# Patient Record
Sex: MALE | Race: WHITE | HISPANIC OR LATINO | Employment: FULL TIME
[De-identification: names, ages, dates, MRNs, and addresses within clinical notes are randomized per-mention and may not be internally consistent; named-entity substitution may affect disease eponyms.]

---
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
## 2020-07-22 ENCOUNTER — ANCILLARY PROCEDURE (OUTPATIENT)
Dept: GENERAL RADIOLOGY | Facility: CLINIC | Age: 20
End: 2020-07-22
Attending: FAMILY MEDICINE

## 2020-07-22 ENCOUNTER — OFFICE VISIT (OUTPATIENT)
Dept: URGENT CARE | Facility: URGENT CARE | Age: 20
End: 2020-07-22

## 2020-07-22 VITALS
TEMPERATURE: 97 F | OXYGEN SATURATION: 99 % | RESPIRATION RATE: 20 BRPM | SYSTOLIC BLOOD PRESSURE: 139 MMHG | HEART RATE: 83 BPM | WEIGHT: 277 LBS | DIASTOLIC BLOOD PRESSURE: 85 MMHG

## 2020-07-22 DIAGNOSIS — B07.8 COMMON WART: ICD-10-CM

## 2020-07-22 DIAGNOSIS — S69.91XA WRIST INJURY, RIGHT, INITIAL ENCOUNTER: Primary | ICD-10-CM

## 2020-07-22 DIAGNOSIS — S69.91XA WRIST INJURY, RIGHT, INITIAL ENCOUNTER: ICD-10-CM

## 2020-07-22 PROCEDURE — 99203 OFFICE O/P NEW LOW 30 MIN: CPT | Mod: 25 | Performed by: FAMILY MEDICINE

## 2020-07-22 PROCEDURE — 17110 DESTRUCTION B9 LES UP TO 14: CPT | Performed by: FAMILY MEDICINE

## 2020-07-22 PROCEDURE — 73110 X-RAY EXAM OF WRIST: CPT | Mod: RT

## 2020-07-23 NOTE — PATIENT INSTRUCTIONS
Make appointment to see primary care or dermatology to be seen in 3-4 weeks for this wart       --    WRIST PAIN  Ibuprofen (400-600mg)/tylenol (325-650mg) every 4-6 hours as needed    If the pain hasn't improved in 5-7 days return for re-evaluation

## 2020-07-23 NOTE — PROGRESS NOTES
"Subjective:   Alfonso Snowden is a 20 year old male who presents for   Chief Complaint   Patient presents with     Musculoskeletal Problem     was playing soccer last night and was goalie, \"jammed\" Right hand in ball. Having Right hand/wrist pain since then     Wart present for about a year on the back of right hand has tried OTC acid and freeze treatment but no effective results seen.     He is right hand dominant.   There are no active problems to display for this patient.    Current Outpatient Medications   Medication     order for DME     No current facility-administered medications for this visit.      ROS:  As above per HPI    Objective:   /85   Pulse 83   Temp 97  F (36.1  C)   Resp 20   Wt 125.6 kg (277 lb)   SpO2 99% , There is no height or weight on file to calculate BMI.  Gen:  NAD, well-nourished, sitting in chair comfortably  HEENT: EOMI, sclera anicteric, Head normocephalic, ; nares patent; moist mucous membranes  Neck: trachea midline, no thyromegaly  CV:  Hemodynamically stable  Pulm:  no increased work of breathing  Extrem: no cyanosis, edema or clubbing  Skin: no obvious rashes or abnormalities  R wrist: no swelling of the wrist, intact movement in flexion and extension although discomforting  R hand: able to move all fingers with intact sensation to touch, dorsal side of hand at the end of 2nd metacarpal is a large wart-like lesion about 1.5 cm in diameter with significant elevation > 2mm, no pain over the scaphoid        No results found for any visits on 07/22/20.  XR wrist r 3 views:  No fractures per my read  Assessment & Plan:   Alfonso Snowden, 20 year old male who presents with:    Common wart  Large solitary wart was treated today with cryotherapy applied along the circumference of the lesion along its border. 3 applications were done. Recommend f/u with PCP or derm in 3-4 weeks.  - DESTRUCT BENIGN LESION, UP TO 14    Wrist injury, right, initial " encounter  Negative for fracture  Wrist brace given for protection/comfort. F/u in  A week if pain has not improved. OTC analgesics PRN.   - XR Wrist Right G/E 3 Views  - order for DME  Dispense: 1 Device; Refill: 0      Chino Cano MD   [facility]    The use of Dragon/DroneCast dictation services may have been used to construct the content in this note; any grammatical or spelling errors are non-intentional. Please contact the author of this note directly if you are in need of any clarification.

## 2022-04-02 ENCOUNTER — APPOINTMENT (OUTPATIENT)
Dept: GENERAL RADIOLOGY | Facility: CLINIC | Age: 22
End: 2022-04-02
Attending: EMERGENCY MEDICINE
Payer: COMMERCIAL

## 2022-04-02 ENCOUNTER — HOSPITAL ENCOUNTER (EMERGENCY)
Facility: CLINIC | Age: 22
Discharge: HOME | End: 2022-04-02
Attending: EMERGENCY MEDICINE | Admitting: EMERGENCY MEDICINE
Payer: COMMERCIAL

## 2022-04-02 ENCOUNTER — APPOINTMENT (OUTPATIENT)
Dept: GENERAL RADIOLOGY | Facility: CLINIC | Age: 22
End: 2022-04-02
Attending: EMERGENCY MEDICINE

## 2022-04-02 VITALS
RESPIRATION RATE: 18 BRPM | TEMPERATURE: 98.1 F | HEART RATE: 105 BPM | DIASTOLIC BLOOD PRESSURE: 73 MMHG | WEIGHT: 277 LBS | OXYGEN SATURATION: 99 % | SYSTOLIC BLOOD PRESSURE: 111 MMHG

## 2022-04-02 DIAGNOSIS — S82.831A CLOSED FRACTURE OF DISTAL END OF RIGHT FIBULA, UNSPECIFIED FRACTURE MORPHOLOGY, INITIAL ENCOUNTER: ICD-10-CM

## 2022-04-02 DIAGNOSIS — S93.431A SYNDESMOTIC DISRUPTION OF RIGHT ANKLE, INITIAL ENCOUNTER: ICD-10-CM

## 2022-04-02 LAB
HOLD SPECIMEN: NORMAL

## 2022-04-02 PROCEDURE — 73610 X-RAY EXAM OF ANKLE: CPT | Mod: RT

## 2022-04-02 PROCEDURE — 99284 EMERGENCY DEPT VISIT MOD MDM: CPT | Mod: 25 | Performed by: EMERGENCY MEDICINE

## 2022-04-02 PROCEDURE — 999N000180 XR SURGERY CARM FLUORO LESS THAN 5 MIN: Mod: TC

## 2022-04-02 PROCEDURE — 96375 TX/PRO/DX INJ NEW DRUG ADDON: CPT | Performed by: EMERGENCY MEDICINE

## 2022-04-02 PROCEDURE — 73610 X-RAY EXAM OF ANKLE: CPT | Mod: 26 | Performed by: RADIOLOGY

## 2022-04-02 PROCEDURE — 250N000011 HC RX IP 250 OP 636: Performed by: EMERGENCY MEDICINE

## 2022-04-02 PROCEDURE — 250N000011 HC RX IP 250 OP 636

## 2022-04-02 PROCEDURE — 99285 EMERGENCY DEPT VISIT HI MDM: CPT | Mod: 25 | Performed by: EMERGENCY MEDICINE

## 2022-04-02 PROCEDURE — 96376 TX/PRO/DX INJ SAME DRUG ADON: CPT | Performed by: EMERGENCY MEDICINE

## 2022-04-02 PROCEDURE — 27786 TREATMENT OF ANKLE FRACTURE: CPT | Mod: 54 | Performed by: EMERGENCY MEDICINE

## 2022-04-02 PROCEDURE — 999N000065 XR ANKLE PORT RIGHT G/E 3 VIEWS

## 2022-04-02 PROCEDURE — 27786 TREATMENT OF ANKLE FRACTURE: CPT | Mod: RT | Performed by: EMERGENCY MEDICINE

## 2022-04-02 PROCEDURE — 96374 THER/PROPH/DIAG INJ IV PUSH: CPT | Performed by: EMERGENCY MEDICINE

## 2022-04-02 PROCEDURE — 999N000065 XR ANKLE RIGHT G/E 3 VIEWS: Mod: RT

## 2022-04-02 RX ORDER — NALOXONE HYDROCHLORIDE 0.4 MG/ML
0.2 INJECTION, SOLUTION INTRAMUSCULAR; INTRAVENOUS; SUBCUTANEOUS
Status: DISCONTINUED | OUTPATIENT
Start: 2022-04-02 | End: 2022-04-02 | Stop reason: HOSPADM

## 2022-04-02 RX ORDER — HYDROCODONE BITARTRATE AND ACETAMINOPHEN 5; 325 MG/1; MG/1
1 TABLET ORAL EVERY 6 HOURS PRN
Qty: 12 TABLET | Refills: 0 | Status: SHIPPED | OUTPATIENT
Start: 2022-04-02 | End: 2022-04-05

## 2022-04-02 RX ORDER — FENTANYL CITRATE 50 UG/ML
100 INJECTION, SOLUTION INTRAMUSCULAR; INTRAVENOUS ONCE
Status: COMPLETED | OUTPATIENT
Start: 2022-04-02 | End: 2022-04-02

## 2022-04-02 RX ORDER — NALOXONE HYDROCHLORIDE 0.4 MG/ML
0.4 INJECTION, SOLUTION INTRAMUSCULAR; INTRAVENOUS; SUBCUTANEOUS
Status: DISCONTINUED | OUTPATIENT
Start: 2022-04-02 | End: 2022-04-02 | Stop reason: HOSPADM

## 2022-04-02 RX ORDER — FENTANYL CITRATE 50 UG/ML
INJECTION, SOLUTION INTRAMUSCULAR; INTRAVENOUS
Status: COMPLETED
Start: 2022-04-02 | End: 2022-04-02

## 2022-04-02 RX ORDER — FENTANYL CITRATE 50 UG/ML
100 INJECTION, SOLUTION INTRAMUSCULAR; INTRAVENOUS
Status: COMPLETED | OUTPATIENT
Start: 2022-04-02 | End: 2022-04-02

## 2022-04-02 RX ADMIN — FENTANYL CITRATE 100 MCG: 50 INJECTION, SOLUTION INTRAMUSCULAR; INTRAVENOUS at 15:36

## 2022-04-02 RX ADMIN — HYDROMORPHONE HYDROCHLORIDE 1 MG: 1 INJECTION, SOLUTION INTRAMUSCULAR; INTRAVENOUS; SUBCUTANEOUS at 12:02

## 2022-04-02 RX ADMIN — FENTANYL CITRATE 100 MCG: 50 INJECTION, SOLUTION INTRAMUSCULAR; INTRAVENOUS at 13:51

## 2022-04-02 RX ADMIN — FENTANYL CITRATE 100 MCG: 50 INJECTION, SOLUTION INTRAMUSCULAR; INTRAVENOUS at 15:43

## 2022-04-02 RX ADMIN — FENTANYL CITRATE 100 MCG: 50 INJECTION, SOLUTION INTRAMUSCULAR; INTRAVENOUS at 14:00

## 2022-04-02 NOTE — ED PROVIDER NOTES
[facility] EMERGENCY DEPARTMENT ([facility])  4/02/22 VTB  History     Chief Complaint   Patient presents with     Ankle Pain     Right     The history is provided by the patient and medical records.     Alfonso Snowden is an otherwise healthy 21 year old male who presents to the emergency department for evaluation of right ankle pain. Patient reports last night he tripped and was trying to regain his balance when he inverted his ankle. He had pain initially after but he went to bed. Today he woke up with tenderness, swelling and pain to the right ankle. He is able to move and feel his toes on his right foot. He has not taken anything for his pain.     Past Medical History  Past Medical History:   Diagnosis Date     Acute bronchiolitis due to other infectious organisms     twice as an infant.     No past surgical history on file.  order for DME      Allergies   Allergen Reactions     No Known Drug Allergies      Family History  Family History   Problem Relation Age of Onset     Respiratory Father         asthma     Respiratory Paternal Grandfather         asthma     Social History   Social History     Tobacco Use     Smoking status: Never Smoker     Smokeless tobacco: Never Used   Substance Use Topics     Alcohol use: Not Currently     Drug use: Not Currently      Past medical history, past surgical history, medications, allergies, family history, and social history were reviewed with the patient. No additional pertinent items.       Review of Systems  A complete review of systems was performed with pertinent positives and negatives noted in the HPI, and all other systems negative.    Physical Exam   BP: (!) 161/65  Pulse: 117  Temp: 98.1  F (36.7  C)  Resp: 18  Weight: 125.6 kg (277 lb)  SpO2: 95 %  Physical Exam  Constitutional:       General: He is not in acute distress.     Appearance: He is not diaphoretic.   HENT:      Head: Atraumatic.   Eyes:      General: No scleral icterus.     Pupils:  Pupils are equal, round, and reactive to light.   Cardiovascular:      Heart sounds: Normal heart sounds.   Pulmonary:      Effort: No respiratory distress.      Breath sounds: Normal breath sounds.   Abdominal:      General: Bowel sounds are normal.      Palpations: Abdomen is soft.      Tenderness: There is no abdominal tenderness.   Musculoskeletal:         General: No tenderness.        Legs:    Skin:     General: Skin is warm.      Findings: No rash.                ED Course     10:39 AM  The patient was seen and examined by Jesús Rajput DO in [facility]    Splint Application    Date/Time: 4/2/2022 8:27 PM  Performed by: Jesús Rajput DO  Authorized by: Jesús Rajput DO     Risks, benefits and alternatives discussed.      PRE-PROCEDURE DETAILS     Sensation:  Normal    PROCEDURE DETAILS     Laterality:  Right    Location:  Ankle    Ankle:  R ankle    Strapping: no      Splint type:  Short leg and ankle stirrup    Supplies:  Ortho-Glass, cotton padding and elastic bandage    POST PROCEDURE DETAILS     Pain:  Unchanged    Sensation:  Normal      PROCEDURE    Patient Tolerance:  Patient tolerated the procedure well with no immediate complications                       No results found for any visits on 04/02/22.  Medications - No data to display     Assessments & Plan (with Medical Decision Making)   This is a 21-year-old male presents with right ankle pain.  This occurred after patient stumbled while walking.  He did not fully fall or have any other injuries.  On exam patient has considerable erythema and tenderness to the ankle.  There is tenderness over the lateral malleolus.  He is neurovascularly intact distally.  X-ray shows distal fibular fracture.  There is also widening that is consistent with syndesmotic injury.  I discussed the case with Orthopedic Surgery.  They note that this will need surgical repair as it is an unstable ankle  fracture.  Patient was given hydromorphone for pain.  Splint was placed using fentanyl for pain control.  On x-rays there was still demonstrated widening of the ankle mortise.  Patient was seen by Orthopedic Surgery who repeated reduction with better alignment.  They will set up an appointment for patient to be seen within the week with plan for surgery shortly after.  Patient will be nonweightbearing until that time.  We will discharge patient on a course of pain medication after discussion of risks regarding this medication. Will discharge home with return precautions. Discussed reasons to return to the emergency department.  Patient understands and agrees with this plan.    I have reviewed the nursing notes. I have reviewed the findings, diagnosis, plan and need for follow up with the patient.    New Prescriptions    No medications on file       Final diagnoses:   None     I, Saray Morin, am serving as a trained medical scribe to document services personally performed by Jesús Rajput DO based on the provider's statements to me on April 2, 2022.  This document has been checked and approved by the attending provider.    IJesús DO, was physically present and have reviewed and verified the accuracy of this note documented by Saray Morin medical scribe.      Jesús Rajput DO  --  Jesús Rajput DO  [facility] EMERGENCY DEPARTMENT  4/2/2022     Jesús Rajput DO  04/02/22 2030

## 2022-04-02 NOTE — ED TRIAGE NOTES
21 yr old male w/c to triage presenting with right ankle pain since last night after he tripped and twisted his right ankle when trying to regain balance. Inverted ankle during trip. Pain in ankle immediately afterwards. Not able to put any weight on right leg. Woke up with swelling and bruising right ankle this am. Has not taken anything for pain this am or last night. Obvious swelling and bruising to right ankle on exam. Point tenderness to medial malleolus.

## 2022-04-02 NOTE — DISCHARGE INSTRUCTIONS
Please make an appointment to follow up with Orthopedics Clinic (phone: [phone]) in 7 days if you do not hear from them.    Return to the emergency department if there are any new symptoms or any cause for concern.

## 2022-04-02 NOTE — CONSULTS
[facility]  ORTHOPAEDIC SURGERY CONSULT    DATE OF CONSULT: 4/2/2022 6:25 PM    REQUESTING PROVIDER Dr Rajput    CC: Right ankle fracture    DATE OF INJURY: 4/1/2022    HISTORY OF PRESENT ILLNESS:   Alfonso Snowden is a 21 year old, healthy, who slipped last night and twisted his right ankle.  He thought it was just a sprain but this morning had swelling and inability to bear weight.  He presented to the emergency department and x-rays revealed a displaced fibula fracture with associated medial clear space widening.  This is a closed injury.  No other injuries.  Pain currently mild.  The ER attempted reduction was unsuccessful therefore orthopedics was consulted.  Patient is otherwise been in his typical state health.    PAST MEDICAL HISTORY:   Past Medical History:   Diagnosis Date     Acute bronchiolitis due to other infectious organisms     twice as an infant.       PAST SURGICAL HISTORY:    No past surgical history on file.    MEDICATIONS:   Prior to Admission medications    Medication Sig Last Dose Taking? Auth Provider   HYDROcodone-acetaminophen (NORCO) 5-325 MG tablet Take 1 tablet by mouth every 6 hours as needed for pain  Yes Jesús Rajput DO   order for DME Equipment being ordered: wrist brace , right   Chino Cano MD       ALLERGIES:   No known drug allergies    SOCIAL HISTORY:   Lives alone  Does not smoke    FAMILY HISTORY:  Family History   Problem Relation Age of Onset     Respiratory Father         asthma     Respiratory Paternal Grandfather         asthma     REVIEW OF SYSTEMS:   A 10-point reviews of systems was negative except as noted above in the HPI.     PHYSICAL EXAM:   Vitals:    04/02/22 1018 04/02/22 1400 04/02/22 1500 04/02/22 1645   BP: (!) 161/65 115/81 113/69 111/73   Pulse: 117 114 105    Resp: 18      Temp: 98.1  F (36.7  C)      TempSrc: Oral      SpO2: 95% 97% 99% 99%   Weight: 125.6 kg (277 lb)        General: Awake, alert, appropriate,  following commands, NAD.  Neuro: CN II-XII grossly intact.   Skin: No rashes,  skin color normal.  HEENT: Atraumatic  Lungs: Breathing comfortably and nonlabored, no wheezes or stridor noted.  Heart/Cardiovascular: Regular pulse, no peripheral cyanosis.    Right Lower Extremity:   Obvious deformity of the ankle  No open wounds, small abrasion medial aspect  Tenderness to palpation at the fibula  No tenderness to palpation at the knee, thigh, hip  No pain with range of motion of the knee or hip.  Fires TA/GSC/EHL/FHL  SILT sp/dp/tibial/saph/sural nerves.   DP/PT pulses palpable, 2+, toes warm and well perfused.       LABS:  No results found for: HGB  No results found for: WBC  No results found for: PLT  No results found for: INR  No results found for: CR  No results found for: GLC    IMAGING:  X-rays of the right ankle: Displaced fibula fracture with medial clear space widening  X-rays right ankle post reduction: Persistent displaced fibula fracture with medial clear space widening  X-rays right ankle postreduction #2 Interval reduction to near anatomic position with no significant residual medial clear space widening    IMPRESSION:   Alfonso Snowden is a 21 year old who slipped and fell on 4/1/2022 and sustained a closed bimalleolar equivalent right ankle fracture.  This was closed reduced and splinted in the emergency department    RECOMMENDATIONS:   Okay to discharge from the emergency department  Discussed with the patient nonweightbearing, crutches, keeping the leg elevated  Discussed with him that this would be ultimately something that would benefit from surgical repair    We will have schedulers contact him this week for follow-up    Assessment and Plan discussed with Dr. Laura, Orthopaedic Surgery Staff.     Mickey Beltran MD 4/2/2022 6:25 PM  Orthopaedic Surgery Resident, PGY-4

## 2022-04-03 ENCOUNTER — HOSPITAL ENCOUNTER (EMERGENCY)
Facility: CLINIC | Age: 22
Discharge: HOME | End: 2022-04-03
Attending: EMERGENCY MEDICINE | Admitting: EMERGENCY MEDICINE
Payer: COMMERCIAL

## 2022-04-03 VITALS
RESPIRATION RATE: 18 BRPM | SYSTOLIC BLOOD PRESSURE: 152 MMHG | OXYGEN SATURATION: 97 % | BODY MASS INDEX: 34.82 KG/M2 | HEIGHT: 75 IN | DIASTOLIC BLOOD PRESSURE: 99 MMHG | TEMPERATURE: 98.1 F | HEART RATE: 108 BPM | WEIGHT: 280 LBS

## 2022-04-03 DIAGNOSIS — Z47.89 CAST DISCOMFORT: ICD-10-CM

## 2022-04-03 DIAGNOSIS — M79.674 PAIN IN TOE OF RIGHT FOOT: ICD-10-CM

## 2022-04-03 PROCEDURE — 99282 EMERGENCY DEPT VISIT SF MDM: CPT

## 2022-04-03 PROCEDURE — 99282 EMERGENCY DEPT VISIT SF MDM: CPT | Performed by: EMERGENCY MEDICINE

## 2022-04-03 ASSESSMENT — ENCOUNTER SYMPTOMS
SHORTNESS OF BREATH: 0
COLOR CHANGE: 0
BRUISES/BLEEDS EASILY: 0
FEVER: 0
NUMBNESS: 0
ADENOPATHY: 0
JOINT SWELLING: 0
WEAKNESS: 0
CHILLS: 0

## 2022-04-04 ENCOUNTER — TELEPHONE (OUTPATIENT)
Dept: ORTHOPEDICS | Facility: CLINIC | Age: 22
End: 2022-04-04

## 2022-04-04 NOTE — ED PROVIDER NOTES
ED Provider Note  [facility]      History     Chief Complaint   Patient presents with     Cast Problem     HPI  Alfonso Snowden is a 21 year old otherwise healthy male who presents to the ED for evaluation of his splint that was placed yesterday here in the ED. Patient presented yesterday to the ED after he stumbled when walking and x-ray revealed a right distal fibular fracture. Orthopedic surgery noted  That he will need surgical intervention and splint was placed in the ED until his follow up with orthopedic surgery.     Patient presents to the emergency department complaining of pain in his right, fifth toe.  He states that the cast is pushing and cutting into his right fifth toe.  He denies any discoloration of the toes in his right foot.  Denies any worsening pain in his right ankle.  No fevers.  No other concerns or complaints.      Past Medical History  Past Medical History:   Diagnosis Date     Acute bronchiolitis due to other infectious organisms     twice as an infant.     History reviewed. No pertinent surgical history.  HYDROcodone-acetaminophen (NORCO) 5-325 MG tablet  order for DME      Allergies   Allergen Reactions     No Known Drug Allergies      Family History  Family History   Problem Relation Age of Onset     Respiratory Father         asthma     Respiratory Paternal Grandfather         asthma     Social History   Social History     Tobacco Use     Smoking status: Never Smoker     Smokeless tobacco: Never Used   Substance Use Topics     Alcohol use: Not Currently     Drug use: Not Currently      Past medical history, past surgical history, medications, allergies, family history, and social history were reviewed with the patient. No additional pertinent items.       Review of Systems   Constitutional: Negative for chills and fever.   Respiratory: Negative for shortness of breath.    Musculoskeletal: Negative for gait problem and joint swelling.        Pain in  "right 5th toe.   Skin: Negative for color change and pallor.   Neurological: Negative for weakness and numbness.   Hematological: Negative for adenopathy. Does not bruise/bleed easily.     A complete review of systems was performed with pertinent positives and negatives noted in the HPI, and all other systems negative.    Physical Exam   BP: (!) 152/99  Pulse: 108  Temp: 98.1  F (36.7  C)  Resp: 18  Height: 190.5 cm (6' 3\")  Weight: 127 kg (280 lb)  SpO2: 97 %  Physical Exam  Constitutional:       General: He is not in acute distress.     Appearance: Normal appearance. He is not ill-appearing, toxic-appearing or diaphoretic.   HENT:      Head: Normocephalic and atraumatic.   Eyes:      Conjunctiva/sclera: Conjunctivae normal.   Cardiovascular:      Rate and Rhythm: Tachycardia present.      Pulses: Normal pulses.           Dorsalis pedis pulses are 2+ on the right side.   Pulmonary:      Effort: Pulmonary effort is normal. No respiratory distress.   Musculoskeletal:         General: No tenderness.      Comments: RLE splinted with plaster.  Edge of the splint is pushing into the right, 5th toe.  No tenderness, edema, erythema, pallor, or discoloration of right foot.   Skin:     General: Skin is warm.      Capillary Refill: Capillary refill takes less than 2 seconds.      Coloration: Skin is not pale.      Findings: No bruising or erythema.      Comments: RLE splinted with plaster.  Edge of the splint is pushing into the right, 5th toe.  No tenderness, edema, erythema, pallor, or discoloration of right foot.   Neurological:      General: No focal deficit present.      Mental Status: He is alert and oriented to person, place, and time.      Coordination: Coordination normal.   Psychiatric:         Mood and Affect: Mood normal.         Behavior: Behavior normal.         Thought Content: Thought content normal.         Judgment: Judgment normal.         ED Course      Procedures     SPLINT READJUSTMENT: Edge of splint " was pushing into right, 5th toe.  Edge of the splint was trimmed with mercy and slightly loosened to the point where patient was able to move right, 5th toe without difficulty.                     No results found for any visits on 04/03/22.  Medications - No data to display     Assessments & Plan (with Medical Decision Making)   21-year-old man presenting with cast/splint discomfort.  He has good perfusion of his distal right lower extremity.  It appears that the edge of the splint is pushing into his right fifth toe and this was loosened in the emergency department to the point where range of motion of right fifth toe was not affected.  His pain has resolved after this.  Again, his right lower extremity/foot is well-perfused and I do not suspect any vascular compromise of the right lower extremity at this time.  Remainder of his splint was left in place and he was advised to follow-up with his at his already scheduled orthopedic appointment.  He agrees with the plan.  He is stable for discharge.  He agrees to return if his symptoms recur.      This part of the medical record was transcribed by Josefina Mauricio, Medical Scribe, from a dictation done by Kailee Macias MD.     I have reviewed the nursing notes. I have reviewed the findings, diagnosis, plan and need for follow up with the patient.    Discharge Medication List as of 4/3/2022  9:34 PM          Final diagnoses:   Cast discomfort   I was physically present and have reviewed and verified the accuracy of this note documented by my scribe.    Kailee Macias MD        --  Kailee Macias MD  [facility] EMERGENCY DEPARTMENT  4/3/2022     Kailee Macias MD  04/03/22 3311

## 2022-04-04 NOTE — TELEPHONE ENCOUNTER
Called patient and spoke with him about scheduling an appointment.  I offered the appointment time as stated in the below message.  Patient accepted the time and asked that I send him an appointment reminder for the address and clinic information.  This was sent to his email [email or web address].  Appointment is set for Friday, April 8 at 1 PM

## 2022-04-04 NOTE — DISCHARGE INSTRUCTIONS
Please follow up with Orthopedics Clinic (phone: [phone]) at your already scheduled appointment.  If you experience any other issued with your splint prior to your follow-up appointment please come back to the Emergency Department.

## 2022-04-04 NOTE — TELEPHONE ENCOUNTER
Called patient twice and left two voicemail's asking to patient to call back to get scheduled for a follow up visit with one of our orthopedic providers. I provided him with the clinic call back number. If he calls back please offer him an appointment with Dr. Houser this Friday 4/8/22 at 1:00 pm for a 40 minute appointment. This is not a templated time slot so the appointment will need to be manually entered.

## 2022-04-04 NOTE — ED TRIAGE NOTES
Patient arrives to triage in a wheelchair with his uncle from home.  Patient was here two days ago, the cast is digging into his pinky toe. He is wondering if they can rewrap it so it is not hurting toe, the pain is pretty bad. He has broke his ankle and tore a ligament which a cast was applied here.  VSS  Patient states he is not losing any feeling in toe it just hurts.

## 2022-04-06 NOTE — TELEPHONE ENCOUNTER
FUTURE VISIT INFORMATION      FUTURE VISIT INFORMATION:    Date: 4/8/2022    Time: 1pm    Location: [facility] [place]  REFERRAL INFORMATION:    Referring provider:  Dr. Rajput     Referring providers clinic:  [place] [facility]     Reason for visit/diagnosis  Closed Fracture distal end of right fibula    RECORDS REQUESTED FROM:       Clinic name Comments Records Status Imaging Status   INternal ED Visit-4/2/2022    XR Ankle-4/2/2022    XR Ankle Port-4/2/2022 Epic PACS

## 2022-04-08 ENCOUNTER — OFFICE VISIT (OUTPATIENT)
Dept: ORTHOPEDICS | Facility: CLINIC | Age: 22
End: 2022-04-08
Payer: COMMERCIAL

## 2022-04-08 ENCOUNTER — PRE VISIT (OUTPATIENT)
Dept: ORTHOPEDICS | Facility: CLINIC | Age: 22
End: 2022-04-08

## 2022-04-08 VITALS — HEIGHT: 72 IN | WEIGHT: 280 LBS | BODY MASS INDEX: 37.93 KG/M2

## 2022-04-08 DIAGNOSIS — S82.891A CLOSED FRACTURE OF RIGHT ANKLE, INITIAL ENCOUNTER: Primary | ICD-10-CM

## 2022-04-08 DIAGNOSIS — Z11.59 ENCOUNTER FOR SCREENING FOR OTHER VIRAL DISEASES: Primary | ICD-10-CM

## 2022-04-08 PROCEDURE — 99204 OFFICE O/P NEW MOD 45 MIN: CPT | Mod: GC | Performed by: ORTHOPAEDIC SURGERY

## 2022-04-08 RX ORDER — HYDROCODONE BITARTRATE AND ACETAMINOPHEN 5; 325 MG/1; MG/1
1 TABLET ORAL EVERY 6 HOURS PRN
COMMUNITY

## 2022-04-08 RX ORDER — CEFAZOLIN SODIUM 1 G/3ML
1 INJECTION, POWDER, FOR SOLUTION INTRAMUSCULAR; INTRAVENOUS SEE ADMIN INSTRUCTIONS
Status: CANCELLED | OUTPATIENT
Start: 2022-04-08

## 2022-04-08 RX ORDER — CEFAZOLIN SODIUM 2 G/50ML
2 SOLUTION INTRAVENOUS
Status: CANCELLED | OUTPATIENT
Start: 2022-04-08

## 2022-04-08 NOTE — H&P (VIEW-ONLY)
Orthopaedic Surgery    S: Alfonso is a very pleasant 21-year-old gentleman who presents to clinic for evaluation and treatment of his right ankle.  He was at home when he tripped and twisted the ankle.  He thought he sprained it and went to bed but woke up the next morning with a lot of swelling and pain so he went to the emergency room.  There, x-rays were obtained and revealed a displaced distal fibula fracture with disrupted ankle mortise.  The ankle was reduced and placed in a splint.  He has been nonweightbearing since that time.  He states the splint is relatively comfortable without any specific areas that are irritating or causing pain.  His pain has been controlled with Norco which he got from the emergency room.  He is on no other medications at baseline.  He has no previous history of foot or ankle pain or problems other than possibly a remote ankle sprain once but he does not recall which side.  No numbness or tingling.  No pain in the calf.  He did obtain a knee scooter yesterday.  He lives on a third floor apartment with his uncle and has good family support however, the building does not have an elevator.  He reports he has been up tach at [employer].       There is no problem list on file for this patient.           Past Medical History:   Diagnosis Date     Acute bronchiolitis due to other infectious organisms     twice as an infant.          No past surgical history on file.   No previous surgeries. No family history of anaesthesia.   No history of bleeding or clotting disorders.          Social History     Tobacco Use     Smoking status: Never Smoker     Smokeless tobacco: Never Used   Substance Use Topics     Alcohol use: Not Currently            Family History   Problem Relation Age of Onset     Respiratory Father         asthma     Respiratory Paternal Grandfather         asthma               Allergies   Allergen Reactions     No Known Drug Allergies             Current  Outpatient Medications   Medication Sig Dispense Refill     HYDROcodone-acetaminophen (NORCO) 5-325 MG tablet Take 1 tablet by mouth every 6 hours as needed for severe pain       order for DME Equipment being ordered: wrist brace , right 1 Device 0         O:   On exam, he is alert and oriented x3, no acute distress.  He is ambulating with a wheelchair.  Exam of the right lower extremity demonstrates proximal calf is soft and nontender.  Nontender at the proximal fibula.  Toes are warm and well-perfused with brisk capillary refill.  Sensation is grossly intact to light touch throughout the forefoot.  He is able to flex and extend all toes with 5 out of 5 strength.    Exam of the left lower extremity demonstrates the calf is soft and nontender.  No swelling or lower extremity edema.  Skin intact.  2+ pulses over the dorsalis pedis and posterior tibialis.  Neurovascularly intact.  Ankle range of motion is supple with 5 out of 5 strength for anterior tibialis, posterior tibialis tendon, gastrocsoleus complex, and peroneals.  8 out of 5 strength for EHL and FHL.  Diffusely nontender throughout the extremity.      Imaging:  Radiographs dated 4/2/2022 reveal a displaced Smith C distal fibular fracture with widening of the syndesmosis and the medial clear space.  Postreduction x-rays demonstrate well splinted ankle with significantly improved ankle mortise.  No fracture of the medial malleolus or posterior malleolus appreciated.      A: Smith C distal fibular fracture with likely syndesmotic disruption, date of injury 4/1/2022, closed reduced in the emergency room on 4/2/2022.    P: X-rays and exam findings reviewed with the patient.  We discussed the nature of his fracture and increased risk of posttraumatic arthrosis if left treated without surgery.  Reviewed the risks and benefits of surgery and the recovery course including nonweightbearing for 8 weeks followed by progressive weightbearing in the cam boot.  We  discussed he would be casted for the first 2 weeks following surgery and a plaster cast and would transition to a CAM boot but remain nonweightbearing for an additional 6 weeks. He wished to proceed with surgery and we will make arrangements for this for next week.            In addition to the above assessment and plan each active problem on Alfonso's problem list was evaluated today. This included the questioning of Alfonso for any medication problems. We will continue the current treatment plan for these active problems except as noted.    The patient was seen and evaluated with Dr. Houser today. Documentation was completed by Earlene Yepez PA-C.

## 2022-04-08 NOTE — PROGRESS NOTES
Orthopaedic Surgery    S: Alfonso is a very pleasant 21-year-old gentleman who presents to clinic for evaluation and treatment of his right ankle.  He was at home when he tripped and twisted the ankle.  He thought he sprained it and went to bed but woke up the next morning with a lot of swelling and pain so he went to the emergency room.  There, x-rays were obtained and revealed a displaced distal fibula fracture with disrupted ankle mortise.  The ankle was reduced and placed in a splint.  He has been nonweightbearing since that time.  He states the splint is relatively comfortable without any specific areas that are irritating or causing pain.  His pain has been controlled with Norco which he got from the emergency room.  He is on no other medications at baseline.  He has no previous history of foot or ankle pain or problems other than possibly a remote ankle sprain once but he does not recall which side.  No numbness or tingling.  No pain in the calf.  He did obtain a knee scooter yesterday.  He lives on a third floor apartment with his uncle and has good family support however, the building does not have an elevator.  He reports he has been up tach at [employer].       There is no problem list on file for this patient.           Past Medical History:   Diagnosis Date     Acute bronchiolitis due to other infectious organisms     twice as an infant.          No past surgical history on file.   No previous surgeries. No family history of anaesthesia.   No history of bleeding or clotting disorders.          Social History     Tobacco Use     Smoking status: Never Smoker     Smokeless tobacco: Never Used   Substance Use Topics     Alcohol use: Not Currently            Family History   Problem Relation Age of Onset     Respiratory Father         asthma     Respiratory Paternal Grandfather         asthma               Allergies   Allergen Reactions     No Known Drug Allergies             Current  Outpatient Medications   Medication Sig Dispense Refill     HYDROcodone-acetaminophen (NORCO) 5-325 MG tablet Take 1 tablet by mouth every 6 hours as needed for severe pain       order for DME Equipment being ordered: wrist brace , right 1 Device 0         O:   On exam, he is alert and oriented x3, no acute distress.  He is ambulating with a wheelchair.  Exam of the right lower extremity demonstrates proximal calf is soft and nontender.  Nontender at the proximal fibula.  Toes are warm and well-perfused with brisk capillary refill.  Sensation is grossly intact to light touch throughout the forefoot.  He is able to flex and extend all toes with 5 out of 5 strength.    Exam of the left lower extremity demonstrates the calf is soft and nontender.  No swelling or lower extremity edema.  Skin intact.  2+ pulses over the dorsalis pedis and posterior tibialis.  Neurovascularly intact.  Ankle range of motion is supple with 5 out of 5 strength for anterior tibialis, posterior tibialis tendon, gastrocsoleus complex, and peroneals.  8 out of 5 strength for EHL and FHL.  Diffusely nontender throughout the extremity.      Imaging:  Radiographs dated 4/2/2022 reveal a displaced Smith C distal fibular fracture with widening of the syndesmosis and the medial clear space.  Postreduction x-rays demonstrate well splinted ankle with significantly improved ankle mortise.  No fracture of the medial malleolus or posterior malleolus appreciated.      A: Smith C distal fibular fracture with likely syndesmotic disruption, date of injury 4/1/2022, closed reduced in the emergency room on 4/2/2022.    P: X-rays and exam findings reviewed with the patient.  We discussed the nature of his fracture and increased risk of posttraumatic arthrosis if left treated without surgery.  Reviewed the risks and benefits of surgery and the recovery course including nonweightbearing for 8 weeks followed by progressive weightbearing in the cam boot.  We  discussed he would be casted for the first 2 weeks following surgery and a plaster cast and would transition to a CAM boot but remain nonweightbearing for an additional 6 weeks. He wished to proceed with surgery and we will make arrangements for this for next week.            In addition to the above assessment and plan each active problem on Alfonso's problem list was evaluated today. This included the questioning of Alfonso for any medication problems. We will continue the current treatment plan for these active problems except as noted.    The patient was seen and evaluated with Dr. Houser today. Documentation was completed by Earlene Yepez PA-C.

## 2022-04-08 NOTE — NURSING NOTE
Reason For Visit:   Chief Complaint   Patient presents with     Consult     Right tibia fracture. Tripped and twisted ankle. Very swollen next day, went to ED. No prior injury.       Ht 1.829 m (6')   Wt 127 kg (280 lb)   BMI 37.97 kg/m           Heide Martinez, ATC

## 2022-04-08 NOTE — LETTER
Date:April 11, 2022      Provider requested that no letter be sent. Do not send.       [facility]

## 2022-04-08 NOTE — NURSING NOTE
Teaching Flowsheet   Relevant Diagnosis: R Ankle ORIF  Teaching Topic: R Ankle ORIF     RN Note: Pt is calm and cooperative, asking questions appropriately. Pt was instructed on pre-surgical packet requirements including H&P, COVID-19 test, NPO, and pre-surgical scrub. Pt verbalized understanding. Pt went to ED on 4/4 will use as H&P, COVID test 3-4 days before surgery, scrub and packet given to pt. Pt will have surgery at [facility] on 4/13.     Person(s) involved in teaching:   Patient      Motivation Level:  Asks Questions: Yes  Eager to Learn: Yes  Cooperative: Yes  Receptive (willing/able to accept information): Yes  Any cultural factors/Shinto beliefs that may influence understanding or compliance? No     Patient demonstrates understanding of the following:  Reason for the appointment, diagnosis and treatment plan: Yes  Knowledge of proper use of medications and conditions for which they are ordered (with special attention to potential side effects or drug interactions): Yes  Which situations necessitate calling provider and whom to contact: Yes    Proper use and care of (medical equip, care aids, etc.): Yes  Nutritional needs and diet plan: Yes  Pain management techniques: Yes  Wound Care: Yes  How and/when to access community resources: Yes    Marcelo Medellin, RNCC

## 2022-04-08 NOTE — LETTER
4/8/2022         RE: Alfonso Snowden  [address]  [place] [place] [address]        Dear Colleague,    Thank you for referring your patient, Alfonso Snowden, to the [facility] [place]. Please see a copy of my visit note below.      Orthopaedic Surgery    S: Alfonso is a very pleasant 21-year-old gentleman who presents to clinic for evaluation and treatment of his right ankle.  He was at home when he tripped and twisted the ankle.  He thought he sprained it and went to bed but woke up the next morning with a lot of swelling and pain so he went to the emergency room.  There, x-rays were obtained and revealed a displaced distal fibula fracture with disrupted ankle mortise.  The ankle was reduced and placed in a splint.  He has been nonweightbearing since that time.  He states the splint is relatively comfortable without any specific areas that are irritating or causing pain.  His pain has been controlled with Norco which he got from the emergency room.  He is on no other medications at baseline.  He has no previous history of foot or ankle pain or problems other than possibly a remote ankle sprain once but he does not recall which side.  No numbness or tingling.  No pain in the calf.  He did obtain a knee scooter yesterday.  He lives on a third floor apartment with his uncle and has good family support however, the building does not have an elevator.  He reports he has been up tach at [employer].       There is no problem list on file for this patient.           Past Medical History:   Diagnosis Date     Acute bronchiolitis due to other infectious organisms     twice as an infant.          No past surgical history on file.   No previous surgeries. No family history of anaesthesia.   No history of bleeding or clotting disorders.          Social History     Tobacco Use     Smoking status: Never Smoker     Smokeless tobacco: Never Used   Substance Use  Topics     Alcohol use: Not Currently            Family History   Problem Relation Age of Onset     Respiratory Father         asthma     Respiratory Paternal Grandfather         asthma               Allergies   Allergen Reactions     No Known Drug Allergies             Current Outpatient Medications   Medication Sig Dispense Refill     HYDROcodone-acetaminophen (NORCO) 5-325 MG tablet Take 1 tablet by mouth every 6 hours as needed for severe pain       order for DME Equipment being ordered: wrist brace , right 1 Device 0         O:   On exam, he is alert and oriented x3, no acute distress.  He is ambulating with a wheelchair.  Exam of the right lower extremity demonstrates proximal calf is soft and nontender.  Nontender at the proximal fibula.  Toes are warm and well-perfused with brisk capillary refill.  Sensation is grossly intact to light touch throughout the forefoot.  He is able to flex and extend all toes with 5 out of 5 strength.    Exam of the left lower extremity demonstrates the calf is soft and nontender.  No swelling or lower extremity edema.  Skin intact.  2+ pulses over the dorsalis pedis and posterior tibialis.  Neurovascularly intact.  Ankle range of motion is supple with 5 out of 5 strength for anterior tibialis, posterior tibialis tendon, gastrocsoleus complex, and peroneals.  8 out of 5 strength for EHL and FHL.  Diffusely nontender throughout the extremity.      Imaging:  Radiographs dated 4/2/2022 reveal a displaced Smith C distal fibular fracture with widening of the syndesmosis and the medial clear space.  Postreduction x-rays demonstrate well splinted ankle with significantly improved ankle mortise.  No fracture of the medial malleolus or posterior malleolus appreciated.      A: Smith C distal fibular fracture with likely syndesmotic disruption, date of injury 4/1/2022, closed reduced in the emergency room on 4/2/2022.    P: X-rays and exam findings reviewed with the patient.  We discussed  the nature of his fracture and increased risk of posttraumatic arthrosis if left treated without surgery.  Reviewed the risks and benefits of surgery and the recovery course including nonweightbearing for 8 weeks followed by progressive weightbearing in the cam boot.  We discussed he would be casted for the first 2 weeks following surgery and a plaster cast and would transition to a CAM boot but remain nonweightbearing for an additional 6 weeks. He wished to proceed with surgery and we will make arrangements for this for next week.            In addition to the above assessment and plan each active problem on Alfonso's problem list was evaluated today. This included the questioning of Alfonso for any medication problems. We will continue the current treatment plan for these active problems except as noted.    The patient was seen and evaluated with Dr. Carrillo today. Documentation was completed by Earlene Yepez PA-C.      Attestation signed by Andrea Carrillo MD at 4/9/2022  9:37 AM:  I was present the entire visit and have communicated with the Patient and the PA.  I concur with evaluation and treatment plan.    Andrea Carrillo MD      Again, thank you for allowing me to participate in the care of your patient.        Sincerely,        ANDREA CARRILLO MD

## 2022-04-09 ENCOUNTER — LAB (OUTPATIENT)
Dept: LAB | Facility: CLINIC | Age: 22
End: 2022-04-09
Attending: ORTHOPAEDIC SURGERY

## 2022-04-09 DIAGNOSIS — Z11.59 ENCOUNTER FOR SCREENING FOR OTHER VIRAL DISEASES: ICD-10-CM

## 2022-04-09 LAB — SARS-COV-2 RNA RESP QL NAA+PROBE: NEGATIVE

## 2022-04-09 PROCEDURE — U0003 INFECTIOUS AGENT DETECTION BY NUCLEIC ACID (DNA OR RNA); SEVERE ACUTE RESPIRATORY SYNDROME CORONAVIRUS 2 (SARS-COV-2) (CORONAVIRUS DISEASE [COVID-19]), AMPLIFIED PROBE TECHNIQUE, MAKING USE OF HIGH THROUGHPUT TECHNOLOGIES AS DESCRIBED BY CMS-2020-01-R: HCPCS

## 2022-04-12 ENCOUNTER — ANESTHESIA EVENT (OUTPATIENT)
Dept: SURGERY | Facility: AMBULATORY SURGERY CENTER | Age: 22
End: 2022-04-12

## 2022-04-12 RX ORDER — NALOXONE HYDROCHLORIDE 0.4 MG/ML
0.4 INJECTION, SOLUTION INTRAMUSCULAR; INTRAVENOUS; SUBCUTANEOUS
Status: DISCONTINUED | OUTPATIENT
Start: 2022-04-12 | End: 2022-04-15 | Stop reason: HOSPADM

## 2022-04-12 RX ORDER — NALOXONE HYDROCHLORIDE 0.4 MG/ML
0.2 INJECTION, SOLUTION INTRAMUSCULAR; INTRAVENOUS; SUBCUTANEOUS
Status: DISCONTINUED | OUTPATIENT
Start: 2022-04-12 | End: 2022-04-15 | Stop reason: HOSPADM

## 2022-04-13 ENCOUNTER — ANESTHESIA (OUTPATIENT)
Dept: SURGERY | Facility: AMBULATORY SURGERY CENTER | Age: 22
End: 2022-04-13

## 2022-04-13 ENCOUNTER — HOSPITAL ENCOUNTER (OUTPATIENT)
Facility: AMBULATORY SURGERY CENTER | Age: 22
Discharge: HOME | End: 2022-04-13
Attending: ORTHOPAEDIC SURGERY
Payer: COMMERCIAL

## 2022-04-13 VITALS
HEART RATE: 85 BPM | HEIGHT: 72 IN | TEMPERATURE: 97.2 F | DIASTOLIC BLOOD PRESSURE: 77 MMHG | WEIGHT: 290 LBS | RESPIRATION RATE: 12 BRPM | BODY MASS INDEX: 39.28 KG/M2 | SYSTOLIC BLOOD PRESSURE: 134 MMHG | OXYGEN SATURATION: 97 %

## 2022-04-13 DIAGNOSIS — S82.891A CLOSED FRACTURE OF RIGHT ANKLE, INITIAL ENCOUNTER: ICD-10-CM

## 2022-04-13 PROCEDURE — 27829 TREAT LOWER LEG JOINT: CPT | Mod: RT

## 2022-04-13 PROCEDURE — C1713 ANCHOR/SCREW BN/BN,TIS/BN: HCPCS

## 2022-04-13 PROCEDURE — 27829 TREAT LOWER LEG JOINT: CPT | Mod: RT | Performed by: ORTHOPAEDIC SURGERY

## 2022-04-13 PROCEDURE — 27784 TREATMENT OF FIBULA FRACTURE: CPT | Mod: RT | Performed by: ORTHOPAEDIC SURGERY

## 2022-04-13 PROCEDURE — 27784 TREATMENT OF FIBULA FRACTURE: CPT | Mod: RT

## 2022-04-13 PROCEDURE — C9290 INJ, BUPIVACAINE LIPOSOME: HCPCS

## 2022-04-13 DEVICE — IMP SCR SYN CORTEX 3.5X16MM SELF TAP SS 204.816: Type: IMPLANTABLE DEVICE | Site: ANKLE | Status: FUNCTIONAL

## 2022-04-13 DEVICE — IMP SCR SYN CORTEX 3.5X50MM SELF TAP SS 204.850: Type: IMPLANTABLE DEVICE | Site: ANKLE | Status: FUNCTIONAL

## 2022-04-13 DEVICE — IMP SCR SYN CORTEX 3.5X14MM SELF TAP SS 204.814: Type: IMPLANTABLE DEVICE | Site: ANKLE | Status: FUNCTIONAL

## 2022-04-13 RX ORDER — SODIUM CHLORIDE, SODIUM LACTATE, POTASSIUM CHLORIDE, CALCIUM CHLORIDE 600; 310; 30; 20 MG/100ML; MG/100ML; MG/100ML; MG/100ML
INJECTION, SOLUTION INTRAVENOUS CONTINUOUS
Status: DISCONTINUED | OUTPATIENT
Start: 2022-04-13 | End: 2022-04-13 | Stop reason: HOSPADM

## 2022-04-13 RX ORDER — CEFAZOLIN SODIUM 2 G/50ML
2 SOLUTION INTRAVENOUS
Status: DISCONTINUED | OUTPATIENT
Start: 2022-04-13 | End: 2022-04-13 | Stop reason: HOSPADM

## 2022-04-13 RX ORDER — BUPIVACAINE HYDROCHLORIDE 2.5 MG/ML
INJECTION, SOLUTION EPIDURAL; INFILTRATION; INTRACAUDAL
Status: COMPLETED | OUTPATIENT
Start: 2022-04-13 | End: 2022-04-13

## 2022-04-13 RX ORDER — PROPOFOL 10 MG/ML
INJECTION, EMULSION INTRAVENOUS CONTINUOUS PRN
Status: DISCONTINUED | OUTPATIENT
Start: 2022-04-13 | End: 2022-04-13

## 2022-04-13 RX ORDER — BUPIVACAINE HYDROCHLORIDE 2.5 MG/ML
INJECTION, SOLUTION INFILTRATION; PERINEURAL PRN
Status: DISCONTINUED | OUTPATIENT
Start: 2022-04-13 | End: 2022-04-13 | Stop reason: HOSPADM

## 2022-04-13 RX ORDER — ONDANSETRON 2 MG/ML
4 INJECTION INTRAMUSCULAR; INTRAVENOUS EVERY 30 MIN PRN
Status: DISCONTINUED | OUTPATIENT
Start: 2022-04-13 | End: 2022-04-15 | Stop reason: HOSPADM

## 2022-04-13 RX ORDER — LIDOCAINE 40 MG/G
CREAM TOPICAL
Status: DISCONTINUED | OUTPATIENT
Start: 2022-04-13 | End: 2022-04-13 | Stop reason: HOSPADM

## 2022-04-13 RX ORDER — ACETAMINOPHEN 325 MG/1
975 TABLET ORAL ONCE
Status: COMPLETED | OUTPATIENT
Start: 2022-04-13 | End: 2022-04-13

## 2022-04-13 RX ORDER — CEFAZOLIN SODIUM IN 0.9 % NACL 3 G/100 ML
3 INTRAVENOUS SOLUTION, PIGGYBACK (ML) INTRAVENOUS
Status: COMPLETED | OUTPATIENT
Start: 2022-04-13 | End: 2022-04-13

## 2022-04-13 RX ORDER — LIDOCAINE HYDROCHLORIDE 20 MG/ML
INJECTION, SOLUTION INFILTRATION; PERINEURAL PRN
Status: DISCONTINUED | OUTPATIENT
Start: 2022-04-13 | End: 2022-04-13

## 2022-04-13 RX ORDER — SODIUM CHLORIDE, SODIUM LACTATE, POTASSIUM CHLORIDE, CALCIUM CHLORIDE 600; 310; 30; 20 MG/100ML; MG/100ML; MG/100ML; MG/100ML
INJECTION, SOLUTION INTRAVENOUS CONTINUOUS
Status: DISCONTINUED | OUTPATIENT
Start: 2022-04-13 | End: 2022-04-15 | Stop reason: HOSPADM

## 2022-04-13 RX ORDER — HYDRALAZINE HYDROCHLORIDE 20 MG/ML
2.5-5 INJECTION INTRAMUSCULAR; INTRAVENOUS EVERY 10 MIN PRN
Status: DISCONTINUED | OUTPATIENT
Start: 2022-04-13 | End: 2022-04-13 | Stop reason: HOSPADM

## 2022-04-13 RX ORDER — FENTANYL CITRATE 50 UG/ML
INJECTION, SOLUTION INTRAMUSCULAR; INTRAVENOUS PRN
Status: DISCONTINUED | OUTPATIENT
Start: 2022-04-13 | End: 2022-04-13

## 2022-04-13 RX ORDER — ONDANSETRON 4 MG/1
4 TABLET, ORALLY DISINTEGRATING ORAL EVERY 30 MIN PRN
Status: DISCONTINUED | OUTPATIENT
Start: 2022-04-13 | End: 2022-04-15 | Stop reason: HOSPADM

## 2022-04-13 RX ORDER — LABETALOL HYDROCHLORIDE 5 MG/ML
10 INJECTION, SOLUTION INTRAVENOUS
Status: DISCONTINUED | OUTPATIENT
Start: 2022-04-13 | End: 2022-04-13 | Stop reason: HOSPADM

## 2022-04-13 RX ORDER — ALBUTEROL SULFATE 0.83 MG/ML
2.5 SOLUTION RESPIRATORY (INHALATION) EVERY 4 HOURS PRN
Status: DISCONTINUED | OUTPATIENT
Start: 2022-04-13 | End: 2022-04-13 | Stop reason: HOSPADM

## 2022-04-13 RX ORDER — FENTANYL CITRATE 50 UG/ML
25 INJECTION, SOLUTION INTRAMUSCULAR; INTRAVENOUS
Status: DISCONTINUED | OUTPATIENT
Start: 2022-04-13 | End: 2022-04-15 | Stop reason: HOSPADM

## 2022-04-13 RX ORDER — BUPIVACAINE HYDROCHLORIDE 5 MG/ML
INJECTION, SOLUTION EPIDURAL; INTRACAUDAL
Status: COMPLETED | OUTPATIENT
Start: 2022-04-13 | End: 2022-04-13

## 2022-04-13 RX ORDER — FENTANYL CITRATE 50 UG/ML
25-50 INJECTION, SOLUTION INTRAMUSCULAR; INTRAVENOUS
Status: DISCONTINUED | OUTPATIENT
Start: 2022-04-13 | End: 2022-04-13 | Stop reason: HOSPADM

## 2022-04-13 RX ORDER — HYDROMORPHONE HYDROCHLORIDE 1 MG/ML
0.2 INJECTION, SOLUTION INTRAMUSCULAR; INTRAVENOUS; SUBCUTANEOUS EVERY 5 MIN PRN
Status: DISCONTINUED | OUTPATIENT
Start: 2022-04-13 | End: 2022-04-13 | Stop reason: HOSPADM

## 2022-04-13 RX ORDER — ONDANSETRON 2 MG/ML
INJECTION INTRAMUSCULAR; INTRAVENOUS PRN
Status: DISCONTINUED | OUTPATIENT
Start: 2022-04-13 | End: 2022-04-13

## 2022-04-13 RX ORDER — HYDROCODONE BITARTRATE AND ACETAMINOPHEN 7.5; 325 MG/15ML; MG/15ML
10 SOLUTION ORAL
Status: DISCONTINUED | OUTPATIENT
Start: 2022-04-13 | End: 2022-04-15 | Stop reason: HOSPADM

## 2022-04-13 RX ORDER — MEPERIDINE HYDROCHLORIDE 25 MG/ML
12.5 INJECTION INTRAMUSCULAR; INTRAVENOUS; SUBCUTANEOUS
Status: DISCONTINUED | OUTPATIENT
Start: 2022-04-13 | End: 2022-04-15 | Stop reason: HOSPADM

## 2022-04-13 RX ORDER — PROPOFOL 10 MG/ML
INJECTION, EMULSION INTRAVENOUS PRN
Status: DISCONTINUED | OUTPATIENT
Start: 2022-04-13 | End: 2022-04-13

## 2022-04-13 RX ORDER — OXYCODONE HYDROCHLORIDE 5 MG/1
5 TABLET ORAL EVERY 4 HOURS PRN
Status: DISCONTINUED | OUTPATIENT
Start: 2022-04-13 | End: 2022-04-15 | Stop reason: HOSPADM

## 2022-04-13 RX ORDER — KETOROLAC TROMETHAMINE 30 MG/ML
15 INJECTION, SOLUTION INTRAMUSCULAR; INTRAVENOUS EVERY 6 HOURS PRN
Status: DISCONTINUED | OUTPATIENT
Start: 2022-04-13 | End: 2022-04-15 | Stop reason: HOSPADM

## 2022-04-13 RX ORDER — CEFAZOLIN SODIUM IN 0.9 % NACL 3 G/100 ML
3 INTRAVENOUS SOLUTION, PIGGYBACK (ML) INTRAVENOUS SEE ADMIN INSTRUCTIONS
Status: DISCONTINUED | OUTPATIENT
Start: 2022-04-13 | End: 2022-04-13 | Stop reason: HOSPADM

## 2022-04-13 RX ORDER — FLUMAZENIL 0.1 MG/ML
0.2 INJECTION, SOLUTION INTRAVENOUS
Status: DISCONTINUED | OUTPATIENT
Start: 2022-04-13 | End: 2022-04-13 | Stop reason: HOSPADM

## 2022-04-13 RX ORDER — FENTANYL CITRATE 50 UG/ML
25 INJECTION, SOLUTION INTRAMUSCULAR; INTRAVENOUS EVERY 5 MIN PRN
Status: DISCONTINUED | OUTPATIENT
Start: 2022-04-13 | End: 2022-04-13 | Stop reason: HOSPADM

## 2022-04-13 RX ORDER — SENNOSIDES A AND B 8.6 MG/1
1 TABLET, FILM COATED ORAL DAILY
Qty: 30 TABLET | Refills: 0 | Status: SHIPPED | OUTPATIENT
Start: 2022-04-13

## 2022-04-13 RX ORDER — OXYCODONE HYDROCHLORIDE 5 MG/1
5-10 TABLET ORAL EVERY 4 HOURS PRN
Qty: 30 TABLET | Refills: 0 | Status: SHIPPED | OUTPATIENT
Start: 2022-04-13 | End: 2022-04-13

## 2022-04-13 RX ORDER — OXYCODONE HYDROCHLORIDE 5 MG/1
5-10 TABLET ORAL EVERY 4 HOURS PRN
Qty: 30 TABLET | Refills: 0 | Status: SHIPPED | OUTPATIENT
Start: 2022-04-13

## 2022-04-13 RX ORDER — CEFAZOLIN SODIUM 1 G/3ML
1 INJECTION, POWDER, FOR SOLUTION INTRAMUSCULAR; INTRAVENOUS SEE ADMIN INSTRUCTIONS
Status: DISCONTINUED | OUTPATIENT
Start: 2022-04-13 | End: 2022-04-13 | Stop reason: HOSPADM

## 2022-04-13 RX ORDER — LORAZEPAM 2 MG/ML
.5-1 INJECTION INTRAMUSCULAR
Status: DISCONTINUED | OUTPATIENT
Start: 2022-04-13 | End: 2022-04-13 | Stop reason: HOSPADM

## 2022-04-13 RX ORDER — DEXAMETHASONE SODIUM PHOSPHATE 4 MG/ML
INJECTION, SOLUTION INTRA-ARTICULAR; INTRALESIONAL; INTRAMUSCULAR; INTRAVENOUS; SOFT TISSUE PRN
Status: DISCONTINUED | OUTPATIENT
Start: 2022-04-13 | End: 2022-04-13

## 2022-04-13 RX ADMIN — Medication 3 G: at 11:42

## 2022-04-13 RX ADMIN — ACETAMINOPHEN 975 MG: 325 TABLET ORAL at 10:15

## 2022-04-13 RX ADMIN — ONDANSETRON 4 MG: 2 INJECTION INTRAMUSCULAR; INTRAVENOUS at 12:45

## 2022-04-13 RX ADMIN — FENTANYL CITRATE 50 MCG: 50 INJECTION, SOLUTION INTRAMUSCULAR; INTRAVENOUS at 13:11

## 2022-04-13 RX ADMIN — LIDOCAINE HYDROCHLORIDE 100 MG: 20 INJECTION, SOLUTION INFILTRATION; PERINEURAL at 11:36

## 2022-04-13 RX ADMIN — OXYCODONE HYDROCHLORIDE 5 MG: 5 TABLET ORAL at 13:47

## 2022-04-13 RX ADMIN — FENTANYL CITRATE 25 MCG: 50 INJECTION, SOLUTION INTRAMUSCULAR; INTRAVENOUS at 14:00

## 2022-04-13 RX ADMIN — BUPIVACAINE HYDROCHLORIDE 15 ML: 2.5 INJECTION, SOLUTION EPIDURAL; INFILTRATION; INTRACAUDAL at 10:30

## 2022-04-13 RX ADMIN — FENTANYL CITRATE 50 MCG: 50 INJECTION, SOLUTION INTRAMUSCULAR; INTRAVENOUS at 11:46

## 2022-04-13 RX ADMIN — DEXAMETHASONE SODIUM PHOSPHATE 4 MG: 4 INJECTION, SOLUTION INTRA-ARTICULAR; INTRALESIONAL; INTRAMUSCULAR; INTRAVENOUS; SOFT TISSUE at 12:04

## 2022-04-13 RX ADMIN — PROPOFOL 285 MG: 10 INJECTION, EMULSION INTRAVENOUS at 11:36

## 2022-04-13 RX ADMIN — ACETAMINOPHEN 975 MG: 325 TABLET ORAL at 10:14

## 2022-04-13 RX ADMIN — FENTANYL CITRATE 25 MCG: 50 INJECTION, SOLUTION INTRAMUSCULAR; INTRAVENOUS at 13:48

## 2022-04-13 RX ADMIN — SODIUM CHLORIDE, SODIUM LACTATE, POTASSIUM CHLORIDE, CALCIUM CHLORIDE: 600; 310; 30; 20 INJECTION, SOLUTION INTRAVENOUS at 10:14

## 2022-04-13 RX ADMIN — PROPOFOL 200 MCG/KG/MIN: 10 INJECTION, EMULSION INTRAVENOUS at 11:36

## 2022-04-13 RX ADMIN — BUPIVACAINE HYDROCHLORIDE 15 ML: 5 INJECTION, SOLUTION EPIDURAL; INTRACAUDAL at 10:38

## 2022-04-13 RX ADMIN — FENTANYL CITRATE 50 MCG: 50 INJECTION, SOLUTION INTRAMUSCULAR; INTRAVENOUS at 12:46

## 2022-04-13 NOTE — OP NOTE
Preop DX:  R ankle Dupuytren Fx with syndesmotic disruption  Postop DX:  Same  Procedure: ORIF R fibular diaphyseal fracture with syndesmotic screw fixation    Attending:  Mik    Indications for procedure:  Fx subluxation 4/2.  Has been in splint non weight bearing since that time.  Wide medial mortise with syndesmotic disruption.    Findings at procedure:  interfrag screw followed by 7 hole 1/3 tubular plate and 3.5 cortical screws appeared to be adequate for reduction and fixation.  Placed foot in neutral dorsiflexion with some varus and appeared to have adequate reduction of talus in ankle mortise.  Intraop images showed adequate orientation of fixation devices and adequate reduction of fracture and talus in ankle mortise.  Placed in short leg plaster in neutral.  I spoke with patient's uncle postop.    Procedure:   Following verbal and written consent, patient taken to OR 7. Following LMA had sterile prep and drape R LE.  Tourniquet placed and total tourniquet time 80 minutes.  Leg supported on bone foam, bump under hip.  Longitudinal incision along posterolateral border fibula.  Sharp dissection to fibula.  Subperiosteal dissection about same.  2.7 minifrag screw placed in interfragmentary fashion after reduction fibula.  Then used 7 hole 1/3 tubular plate with 3.5 cortical screws.  Irrigation used throughout case.  We then placed foot in neutral dorsilexion with some inversion and varus force as we placed the distal syndesmotic screw.  We then placed a second screw, removing one of the 7 hole 1/3 tubular screws and placing the screw through this to help secure the syndesmosis.  Ap/lat/mortise views showed adequate position of talus in ankle mortise and reduction/fixation of fibular fracture.  We irrigated and closed periosteum with 2-0 vicryl.  Deep and superficial layers closed with 2-0 vicryl in interrupted figure of 8 fashion.  Skin was closed with 3-0 nylon in interrupted vertical mattress fashion.   Xeroform, 4x4, sterile cast padding applied after injecting 1/4 plain marcaine.  Patient placed in short leg plaster cast.  Taken to recovery without incident.  I spoke with patient's uncle postop.

## 2022-04-13 NOTE — OR NURSING
Patient received adductor canal and popliteal block to right leg in pre procedure. Vital signs stable, tolerated well. Off label liposome bupivicaine used.

## 2022-04-13 NOTE — ANESTHESIA PROCEDURE NOTES
Adductor canal Procedure Note    Pre-Procedure   Staff -        Anesthesiologist:  Colt Wright MD       Performed By: anesthesiologist       Location: pre-op       Procedure Start/Stop Times: 4/13/2022 10:30 AM and 4/13/2022 10:37 AM       Pre-Anesthestic Checklist: patient identified, IV checked, site marked, risks and benefits discussed, informed consent, monitors and equipment checked, pre-op evaluation, at physician/surgeon's request and post-op pain management  Timeout:       Correct Patient: Yes        Correct Procedure: Yes        Correct Site: Yes        Correct Position: Yes        Correct Laterality: Yes        Site Marked: Yes  Procedure Documentation  Procedure: Adductor canal       Laterality: right       Patient Position: supine       Patient Prep/Sterile Barriers: sterile gloves, mask, patient draped       Skin prep: Chloraprep       Needle Type: insulated and short bevel       Needle Gauge: 21.        Needle Length (Inches): 4        Ultrasound guided       1. Ultrasound was used to identify targeted nerve, plexus, vascular marker, or fascial plane and place a needle adjacent to it in real-time.       2. Ultrasound was used to visualize the spread of anesthetic in close proximity to the above referenced structure.       3. A permanent image is entered into the patient's record.       4. The visualized anatomic structures appeared normal.       5. There were no apparent abnormal pathologic findings.    Assessment/Narrative         The placement was negative for: blood aspirated, painful injection and site bleeding       Paresthesias: No.       Bolus given via needle..        Secured via.        Insertion/Infusion Method: Single Shot    Medication(s) Administered   Bupivacaine 0.25% PF (Infiltration) - Infiltration   15 mL - 4/13/2022 10:30:00 AM  Bupivacaine liposome (Exparel) 1.3% LA inj susp (Infiltration) - Infiltration   10 mL - 4/13/2022 10:30:00 AM  Medication Administration Time:  4/13/2022 10:30 AM     Comments:  133 mg exparel given via adductor canal block

## 2022-04-13 NOTE — ANESTHESIA POSTPROCEDURE EVALUATION
Patient: Alfonso Snowden    Procedure: Procedure(s):  OPEN REDUCTION INTERNAL FIXATION, FRACTURE, RIGHT FIBULA       Anesthesia Type:  General    Note:  Disposition: Outpatient   Postop Pain Control: Uneventful            Sign Out: Well controlled pain   PONV: No   Neuro/Psych: Uneventful            Sign Out: Acceptable/Baseline neuro status   Airway/Respiratory: Uneventful            Sign Out: Acceptable/Baseline resp. status   CV/Hemodynamics: Uneventful            Sign Out: Acceptable CV status; No obvious hypovolemia; No obvious fluid overload   Other NRE: NONE   DID A NON-ROUTINE EVENT OCCUR? No           Last vitals:  Vitals Value Taken Time   /85 04/13/22 1400   Temp 36.2  C (97.2  F) 04/13/22 1400   Pulse 71 04/13/22 1407   Resp 8 04/13/22 1407   SpO2 99 % 04/13/22 1407   Vitals shown include unvalidated device data.    Electronically Signed By: Colt Wright MD, MD  April 13, 2022  2:14 PM

## 2022-04-13 NOTE — INTERVAL H&P NOTE
I have reviewed the surgical (or preoperative) H&P that is linked to this encounter, and examined the patient. There are no significant changes    Clinical Conditions Present on Arrival:  Clinically Significant Risk Factors Present on Admission                   # Obesity: Estimated body mass index is 39.33 kg/m  as calculated from the following:    Height as of this encounter: 1.829 m (6').    Weight as of this encounter: 131.5 kg (290 lb).

## 2022-04-13 NOTE — ANESTHESIA PREPROCEDURE EVALUATION
Anesthesia Pre-Procedure Evaluation    Patient: Alfonso Snowden   MRN: [id number] : 2000        Procedure : Procedure(s):  OPEN REDUCTION INTERNAL FIXATION, FRACTURE, RIGHT FIBULA          Past Medical History:   Diagnosis Date     Acute bronchiolitis due to other infectious organisms     twice as an infant.      History reviewed. No pertinent surgical history.   Allergies   Allergen Reactions     No Known Drug Allergies       Social History     Tobacco Use     Smoking status: Never Smoker     Smokeless tobacco: Never Used   Substance Use Topics     Alcohol use: Yes     Comment: rarely      Wt Readings from Last 1 Encounters:   22 131.5 kg (290 lb)        Anesthesia Evaluation   Pt has not had prior anesthetic         ROS/MED HX  ENT/Pulmonary:  - neg pulmonary ROS     Neurologic:  - neg neurologic ROS     Cardiovascular:  - neg cardiovascular ROS     METS/Exercise Tolerance:     Hematologic:  - neg hematologic  ROS     Musculoskeletal:   (+) fracture,     GI/Hepatic:  - neg GI/hepatic ROS     Renal/Genitourinary:  - neg Renal ROS     Endo:  - neg endo ROS     Psychiatric/Substance Use:  - neg psychiatric ROS     Infectious Disease:  - neg infectious disease ROS     Malignancy:       Other:            Physical Exam    Airway  airway exam normal           Respiratory Devices and Support         Dental  no notable dental history         Cardiovascular   cardiovascular exam normal          Pulmonary   pulmonary exam normal                OUTSIDE LABS:  CBC: No results found for: WBC, HGB, HCT, PLT  BMP: No results found for: NA, POTASSIUM, CHLORIDE, CO2, BUN, CR, GLC  COAGS: No results found for: PTT, INR, FIBR  POC: No results found for: BGM, HCG, HCGS  HEPATIC: No results found for: ALBUMIN, PROTTOTAL, ALT, AST, GGT, ALKPHOS, BILITOTAL, BILIDIRECT, TRAVIS  OTHER: No results found for: PH, LACT, A1C, RODOLFO, PHOS, MAG, LIPASE, AMYLASE, TSH, T4, T3, CRP, SED    Anesthesia Plan    ASA Status:  2   NPO  Status:  NPO Appropriate    Anesthesia Type: General.     - Airway: LMA   Induction: Intravenous.   Maintenance: TIVA.        Consents    Anesthesia Plan(s) and associated risks, benefits, and realistic alternatives discussed. Questions answered and patient/representative(s) expressed understanding.     - Discussed: Risks, Benefits and Alternatives for BOTH SEDATION and the PROCEDURE were discussed     - Discussed with:  Patient         Postoperative Care    Pain management: Oral pain medications, Peripheral nerve block (Single Shot).   PONV prophylaxis: Ondansetron (or other 5HT-3), Dexamethasone or Solumedrol     Comments:    Other Comments: Discussed with Patient Off-Label use of Liposomal Bupivacaine (Exparel) for Nerve Block.    Relevant risks & benefits were discussed with patient.    All questions were answered and there was agreement to proceed.    Patient signed Off-Label Use of Exparel Consent Form.    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             Colt Wright MD, MD

## 2022-04-13 NOTE — ANESTHESIA CARE TRANSFER NOTE
Patient: Alfonso Snowden    Procedure: Procedure(s):  OPEN REDUCTION INTERNAL FIXATION, FRACTURE, RIGHT FIBULA       Diagnosis: Closed fracture of right ankle, initial encounter [S88.228X]  Diagnosis Additional Information: No value filed.    Anesthesia Type:   General     Note:    Oropharynx: oropharynx clear of all foreign objects and spontaneously breathing  Level of Consciousness: drowsy  Oxygen Supplementation: face mask  Level of Supplemental Oxygen (L/min / FiO2): 6  Independent Airway: airway patency satisfactory and stable  Dentition: dentition unchanged  Vital Signs Stable: post-procedure vital signs reviewed and stable  Report to RN Given: handoff report given  Patient transferred to: PACU    Handoff Report: Identifed the Patient, Identified the Reponsible Provider, Reviewed the pertinent medical history, Discussed the surgical course, Reviewed Intra-OP anesthesia mangement and issues during anesthesia, Set expectations for post-procedure period and Allowed opportunity for questions and acknowledgement of understanding      Vitals:  Vitals Value Taken Time   /67 04/13/22 1328   Temp 36.1  C (96.9  F) 04/13/22 1328   Pulse 75 04/13/22 1329   Resp 14 04/13/22 1329   SpO2 98 % 04/13/22 1329   Vitals shown include unvalidated device data.    Electronically Signed By: SHLOMO Arguello CRNA  April 13, 2022  1:31 PM

## 2022-04-13 NOTE — DISCHARGE INSTRUCTIONS
"[facility]  Home Care Following Anesthesia  For 24 hours after surgery:  Get plenty of rest.  A responsible adult must stay with you for at least 24 hours after you leave the surgery center.  Do not drive or use heavy equipment.  If you have weakness or tingling, don't drive or use heavy equipment until this feeling goes away.   Do not drink alcohol.   Avoid strenuous or risky activities.  Ask for help when climbing stairs.  You may feel lightheaded.  IF so, sit for a few minutes before standing.  Have someone help you get up.   If you have nausea (feel sick to your stomach): Drink only clear liquids such as apple juice, ginger ale, broth or 7-Up.  Rest may also help.  Be sure to drink enough fluids.  Move to a regular diet as you feel able.   You may have a slight fever.  Call the doctor if your fever is over 100 F (37.7 C) (taken under the tongue) or lasts longer than 24 hours.  You may have a dry mouth, a sore throat, muscle aches or trouble sleeping. These should go away after 24 hours.  Do not make important or legal decisions.   It is recommended to avoid smoking.        Today you received an Exparel block to numb the nerves near your surgery site.  This is a block using local anesthetic or \"numbing\" medication injected around the nerves to anesthetize or \"numb\" the area supplied by those nerves.  This block is injected into the muscle layer near your surgical site.  This medication may numb the location where you had surgery up to 72 hours.  If your surgical site is an arm or leg you should be careful with your affected limb, since it is possible to injure your limb without being aware of it due to the numbing.  Until full feeling returns, you should guard against bumping or hitting your limb, and avoid extreme hot or cold temperatures on the skin.  As the block wears off, the feeling will return as a tingling or prickly sensation near your surgical site.  You will " experince more discomfort from your incision as the feeling returns.  You may want to take a pain pill (a narcotic or Tylenol if this was prescribed by your surgeon) when you start to experience mild pain before the pain beomes more severe.  If your pain medications do not control your pain, you should notify your surgeon.    Tips for taking pain medications  To get the best pain relief possible, remember these points:  Take pain medications as directed, before pain becomes severe.  Pain medication can upset your stomach: taking it with food may help.  Constipation is a common side effect of pain medication. Drink plenty of  fluids.  Eat foods high in fiber. Take a stool softener if recommended by your doctor or pharmacist.  Do not drink alcohol, drive or operate machinery while taking pain medications.  Ask about other ways to control pain, such as with heat, ice or relaxation.    Tylenol/Acetaminophen Consumption  To help encourage the safe use of acetaminophen, the makers of TYLENOL  have lowered the maximum daily dose for single-ingredient Extra Strength TYLENOL  (acetaminophen) products sold in the [place] from 8 pills per day (4,000 mg) to 6 pills per day (3,000 mg). The dosing interval has also changed from 2 pills every 4-6 hours to 2 pills every 6 hours.  If you feel your pain relief is insufficient, you may take Tylenol/Acetaminophen in addition to your narcotic pain medication.   Be careful not to exceed 3,000 mg of Tylenol/Acetaminophen in a 24 hour period from all sources.  If you are taking extra strength Tylenol/acetaminophen (500 mg), the maximum dose is 6 tablets in 24 hours.  If you are taking regular strength acetaminophen (325 mg), the maximum dose is 9 tablets in 24 hours.    Call a doctor for any of the following:  Signs of infection (fever, growing tenderness at the surgery site, a large amount of drainage or bleeding, severe pain, foul-smelling drainage, redness, swelling).  It has been over 8  to 10 hours since surgery and you are still not able to urinate (pass water).  Headache for over 24 hours.  Numbness, tingling or weakness the day after surgery (if you had spinal anesthesia).  Signs of Covid-19 infection (temperature over 100 degrees, shortness of breath, cough, loss of taste/smell, generalized body aches, persistent headache, chills, sore throat, nausea/vomiting/diarrhea)  Your doctor is:  Dr. Andrea Houser, Orthopaedics: [phone]                    Or dial [phone] and ask for the resident on call for:  Orthopaedics  For emergency care, call the:  [facility] Emergency Department: [phone] (TTY for hearing impaired: [phone])

## 2022-04-13 NOTE — ANESTHESIA PROCEDURE NOTES
Sciatic Procedure Note    Pre-Procedure   Staff -        Anesthesiologist:  Colt Wright MD       Performed By: anesthesiologist       Location: pre-op       Procedure Start/Stop Times: 4/13/2022 10:38 AM and 4/13/2022 10:44 AM       Pre-Anesthestic Checklist: patient identified, IV checked, site marked, risks and benefits discussed, informed consent, monitors and equipment checked, pre-op evaluation, at physician/surgeon's request and post-op pain management  Timeout:       Correct Patient: Yes        Correct Procedure: Yes        Correct Site: Yes        Correct Position: Yes        Correct Laterality: Yes        Site Marked: Yes  Procedure Documentation  Procedure: Sciatic       Laterality: right       Patient Position: supine       Patient Prep/Sterile Barriers: sterile gloves, mask, patient draped       Skin prep: Chloraprep (popliteal approach).       Needle Type: insulated and short bevel       Needle Gauge: 21.        Needle Length (Inches): 4        Ultrasound guided       1. Ultrasound was used to identify targeted nerve, plexus, vascular marker, or fascial plane and place a needle adjacent to it in real-time.       2. Ultrasound was used to visualize the spread of anesthetic in close proximity to the above referenced structure.       3. A permanent image is entered into the patient's record.       4. The visualized anatomic structures appeared normal.       5. There were no apparent abnormal pathologic findings.    Assessment/Narrative         The placement was negative for: blood aspirated, painful injection and site bleeding       Paresthesias: No.       Bolus given via needle..        Secured via.        Insertion/Infusion Method: Single Shot       Complications: none    Medication(s) Administered   Bupivacaine 0.5% PF (Infiltration) - Infiltration   15 mL - 4/13/2022 10:38:00 AM  Bupivacaine liposome (Exparel) 1.3% LA inj susp (Infiltration) - Infiltration   10 mL - 4/13/2022 10:38:00  AM  Medication Administration Time: 4/13/2022 10:38 AM     Comments:  133 mg exparel given via popliteal block

## 2022-05-03 ENCOUNTER — TELEPHONE (OUTPATIENT)
Dept: ORTHOPEDICS | Facility: CLINIC | Age: 22
End: 2022-05-03

## 2022-05-03 NOTE — TELEPHONE ENCOUNTER
[facility] Call Center    Phone Message    May a detailed message be left on voicemail: yes     Reason for Call: Other: Patient wants to know how long he will be in his cast on right ankle. Please call patient regarding this     Action Taken: Other: [facility]    Travel Screening: Not Applicable

## 2022-05-05 ENCOUNTER — TELEPHONE (OUTPATIENT)
Dept: ORTHOPEDICS | Facility: CLINIC | Age: 22
End: 2022-05-05

## 2022-05-05 NOTE — TELEPHONE ENCOUNTER
RN called pt to discuss recovery. RN unsure length of time in cast d/t insufficient info in Dr. Houser's op note. RN did discuss that it depends on severity of injury and what Dr. Houser saw in surgery, pt verbalized understanding. RN also noted that no follow-up appts have been scheduled c pt. RN scheduled pt for tomorrow for cast removal, suture removal, and examination/assessment. Pt verbalized understanding.     Marcelo Medellin RNCC

## 2022-05-13 ENCOUNTER — ANCILLARY PROCEDURE (OUTPATIENT)
Dept: GENERAL RADIOLOGY | Facility: CLINIC | Age: 22
End: 2022-05-13
Attending: PHYSICIAN ASSISTANT
Payer: COMMERCIAL

## 2022-05-13 ENCOUNTER — OFFICE VISIT (OUTPATIENT)
Dept: ORTHOPEDICS | Facility: CLINIC | Age: 22
End: 2022-05-13

## 2022-05-13 DIAGNOSIS — S82.891A CLOSED FRACTURE OF RIGHT ANKLE, INITIAL ENCOUNTER: Primary | ICD-10-CM

## 2022-05-13 DIAGNOSIS — S82.891A CLOSED FRACTURE OF RIGHT ANKLE, INITIAL ENCOUNTER: ICD-10-CM

## 2022-05-13 PROCEDURE — 73610 X-RAY EXAM OF ANKLE: CPT | Mod: RT | Performed by: RADIOLOGY

## 2022-05-13 PROCEDURE — 99024 POSTOP FOLLOW-UP VISIT: CPT | Performed by: PHYSICIAN ASSISTANT

## 2022-05-13 NOTE — LETTER
5/13/2022         RE: Alfonso Snowden  [address]  [place] [place] [address]        Dear Colleague,    Thank you for referring your patient, Alfonso Snowden, to the [facility] [place]. Please see a copy of my visit note below.    ASSESSMENT/PLAN:  Alfonso Snowden is a 21 year old who is status post ORIF right fibula and syndesmosis with Dr. Houser on 4/13/22.    Basic wound cares were performed at today's visit including removal of nylon sutures. Incision was cleansed and he was counseled on incision care. He was directed to refrain from soaking in a tub or a pool. He may shower and pat dry. He was also instructed to cleanse incision with betadine which he can obtain OTC.     The patient will see Dr. Houser in 4 weeks for recheck and lucius-ray. We discussed he is to be non-weightbearing until then, but can remove the boot to sleep, for hygiene, and for ROM spelling the alphabet with his foot at least 3 times daily. He was directed to call with any redness or drainage from the incision.  Alfonso has our clinic number and will call with any questions or concerns.    Earlene Yepez PA-C  Orthopaedic Surgery    _________________________________    HISTORY OF PRESENT ILLNESS:  Alfonso Snowden is a 21 year old male who is approximately 3 weeks status post the above procedure.    Weight bearing status/devices: NWB with crutches, knee scooter for work.   Pain level and management: pain is 0/10, currently using APAP prn, off all narcotics.   Physical therapy & ROM: none at this time.      Compliant with NWB in the cast. No falls. No concerns. Pain is minimal.  Alfonso denies recent fevers and chills, as well as any other symptoms concerning for infection.     DVT prophylaxis: ASA 162mg daily  Patient denies calf pain or tenderness.      MEDICATIONS:   Current Outpatient Rx   Medication Sig Dispense Refill     aspirin (ASA) 81 MG EC tablet Take 2 tablets  (162 mg) by mouth daily (Patient not taking: Reported on 5/13/2022) 60 tablet 0     HYDROcodone-acetaminophen (NORCO) 5-325 MG tablet Take 1 tablet by mouth every 6 hours as needed for severe pain (Patient not taking: Reported on 5/13/2022)       order for DME Equipment being ordered: wrist brace , right (Patient not taking: Reported on 5/13/2022) 1 Device 0     oxyCODONE (ROXICODONE) 5 MG tablet Take 1-2 tablets (5-10 mg) by mouth every 4 hours as needed for moderate to severe pain (Patient not taking: Reported on 5/13/2022) 30 tablet 0     senna (SENOKOT) 8.6 MG tablet Take 1 tablet by mouth daily (Patient not taking: Reported on 5/13/2022) 30 tablet 0         ALLERGIES: No known drug allergies       PHYSICAL EXAMINATION:   On physical examination the patient is comfortable and is in no acute distress. The affect is appropriate and breathing is non-labored.    Surgical wound: The surgical wound was exposed and found to be clean and dry, without drainage or discharge. There is mild edema around the incision. There is no erythema. The skin was appropriately warm to touch. Nylon sutures removed without complication.     Ankle ROM in tact for PF/DF/IV/EV and toe ROM. Mild calf atrophy present.     Motor intact distally throughout the tibial and peroneal nerve distributions, 5/5 strength with tibialis anterior, gastrocnemius and soleus, EHL, FHL firing  Sensation intact to light touch throughout superficial peroneal, deep peroneal, tibial, saphenous, and sural nerves  Dorsalis pedis and posterior tibial pulses palpable, toes warm and well-perfused    Imaging:  3 views of the right ankle are ordered, performed and interpreted today by myself and Dr. Houser. These include AP, mortise and lateral non-weightbearing views. These demonstrate plate and screw fixation of the fibula with two screws spanning the syndesmosis. No widening of the medial clear space or syndesmosis. Note is made of a healing cortical rim fracture of  the posterior malleolus as well.           Again, thank you for allowing me to participate in the care of your patient.        Sincerely,        Earlene Yepez PA-C

## 2022-05-13 NOTE — PROGRESS NOTES
ASSESSMENT/PLAN:  Alfonso Snowden is a 21 year old who is status post ORIF right fibula and syndesmosis with Dr. Houser on 4/13/22.    Basic wound cares were performed at today's visit including removal of nylon sutures. Incision was cleansed and he was counseled on incision care. He was directed to refrain from soaking in a tub or a pool. He may shower and pat dry. He was also instructed to cleanse incision with betadine which he can obtain OTC.     The patient will see Dr. Houser in 4 weeks for recheck and lucius-ray. We discussed he is to be non-weightbearing until then, but can remove the boot to sleep, for hygiene, and for ROM spelling the alphabet with his foot at least 3 times daily. He was directed to call with any redness or drainage from the incision.  Alfonso has our clinic number and will call with any questions or concerns.    Earlene Yepez PA-C  Orthopaedic Surgery    _________________________________    HISTORY OF PRESENT ILLNESS:  Alfonso Snowdne is a 21 year old male who is approximately 3 weeks status post the above procedure.    Weight bearing status/devices: NWB with crutches, knee scooter for work.   Pain level and management: pain is 0/10, currently using APAP prn, off all narcotics.   Physical therapy & ROM: none at this time.      Compliant with NWB in the cast. No falls. No concerns. Pain is minimal.  Alfonso denies recent fevers and chills, as well as any other symptoms concerning for infection.     DVT prophylaxis: ASA 162mg daily  Patient denies calf pain or tenderness.      MEDICATIONS:   Current Outpatient Rx   Medication Sig Dispense Refill     aspirin (ASA) 81 MG EC tablet Take 2 tablets (162 mg) by mouth daily (Patient not taking: Reported on 5/13/2022) 60 tablet 0     HYDROcodone-acetaminophen (NORCO) 5-325 MG tablet Take 1 tablet by mouth every 6 hours as needed for severe pain (Patient not taking: Reported on 5/13/2022)       order for DME Equipment being ordered:  wrist brace , right (Patient not taking: Reported on 5/13/2022) 1 Device 0     oxyCODONE (ROXICODONE) 5 MG tablet Take 1-2 tablets (5-10 mg) by mouth every 4 hours as needed for moderate to severe pain (Patient not taking: Reported on 5/13/2022) 30 tablet 0     senna (SENOKOT) 8.6 MG tablet Take 1 tablet by mouth daily (Patient not taking: Reported on 5/13/2022) 30 tablet 0         ALLERGIES: No known drug allergies       PHYSICAL EXAMINATION:   On physical examination the patient is comfortable and is in no acute distress. The affect is appropriate and breathing is non-labored.    Surgical wound: The surgical wound was exposed and found to be clean and dry, without drainage or discharge. There is mild edema around the incision. There is no erythema. The skin was appropriately warm to touch. Nylon sutures removed without complication.     Ankle ROM in tact for PF/DF/IV/EV and toe ROM. Mild calf atrophy present.     Motor intact distally throughout the tibial and peroneal nerve distributions, 5/5 strength with tibialis anterior, gastrocnemius and soleus, EHL, FHL firing  Sensation intact to light touch throughout superficial peroneal, deep peroneal, tibial, saphenous, and sural nerves  Dorsalis pedis and posterior tibial pulses palpable, toes warm and well-perfused    Imaging:  3 views of the right ankle are ordered, performed and interpreted today by myself and Dr. Houser. These include AP, mortise and lateral non-weightbearing views. These demonstrate plate and screw fixation of the fibula with two screws spanning the syndesmosis. No widening of the medial clear space or syndesmosis. Note is made of a healing cortical rim fracture of the posterior malleolus as well.

## 2022-05-13 NOTE — LETTER
Date:May 24, 2022      Provider requested that no letter be sent. Do not send.       [facility]

## 2022-05-13 NOTE — NURSING NOTE
Reason For Visit:   Chief Complaint   Patient presents with     Surgical Followup     f/u s/p R ankle ORIF // DOS: 4/13/22 with Dr. Houser       There were no vitals taken for this visit.    Pain Assessment  Patient Currently in Pain: Herbert Gregory, ATC

## 2022-05-17 ENCOUNTER — TELEPHONE (OUTPATIENT)
Dept: ORTHOPEDICS | Facility: CLINIC | Age: 22
End: 2022-05-17

## 2022-05-17 NOTE — TELEPHONE ENCOUNTER
Returned call to Alfonso about return to work form needed. Pt stated employer did not specify if he needed a particular form so writer and pt decided a letter with return to work date ASAP and any restrictions given by Dr. Houser would likely be sufficient.  Letter to be scanned to email [email or web address] once complete     Luciana Cevallos

## 2022-05-20 ENCOUNTER — DOCUMENTATION ONLY (OUTPATIENT)
Dept: ORTHOPEDICS | Facility: CLINIC | Age: 22
End: 2022-05-20

## 2022-05-20 NOTE — PROGRESS NOTES
FMLA/RTW forms complete and scanned back to pt via email  Sent to be scanned into chart    Luciana Cevallos

## 2022-05-26 DIAGNOSIS — S82.891A CLOSED FRACTURE OF RIGHT ANKLE, INITIAL ENCOUNTER: Primary | ICD-10-CM

## 2022-06-10 ENCOUNTER — OFFICE VISIT (OUTPATIENT)
Dept: ORTHOPEDICS | Facility: CLINIC | Age: 22
End: 2022-06-10

## 2022-06-10 ENCOUNTER — ANCILLARY PROCEDURE (OUTPATIENT)
Dept: GENERAL RADIOLOGY | Facility: CLINIC | Age: 22
End: 2022-06-10
Attending: ORTHOPAEDIC SURGERY
Payer: COMMERCIAL

## 2022-06-10 VITALS — HEIGHT: 72 IN | WEIGHT: 290 LBS | BODY MASS INDEX: 39.28 KG/M2

## 2022-06-10 DIAGNOSIS — S82.891D CLOSED FRACTURE OF RIGHT ANKLE WITH ROUTINE HEALING, SUBSEQUENT ENCOUNTER: Primary | ICD-10-CM

## 2022-06-10 PROCEDURE — 99024 POSTOP FOLLOW-UP VISIT: CPT | Performed by: ORTHOPAEDIC SURGERY

## 2022-06-10 PROCEDURE — 73610 X-RAY EXAM OF ANKLE: CPT | Mod: RT | Performed by: RADIOLOGY

## 2022-06-10 NOTE — PROGRESS NOTES
S: Patient states he is doing well after fracture fixation right ankle.  No problems at this time.  He has remained nonweightbearing right lower extremity.  He has been working on ankle motion.  No fevers or chills.         Patient Active Problem List   Diagnosis     Closed fracture of right ankle, initial encounter            Past Medical History:   Diagnosis Date     Acute bronchiolitis due to other infectious organisms     twice as an infant.            Past Surgical History:   Procedure Laterality Date     OPEN REDUCTION INTERNAL FIXATION FIBULA Right 4/13/2022    Procedure: OPEN REDUCTION INTERNAL FIXATION, FRACTURE, RIGHT FIBULA;  Surgeon: Andrea Houser MD;  Location: [facility] [place]            Social History     Tobacco Use     Smoking status: Never Smoker     Smokeless tobacco: Never Used   Substance Use Topics     Alcohol use: Yes     Comment: rarely            Family History   Problem Relation Age of Onset     Respiratory Father         asthma     Respiratory Paternal Grandfather         asthma               Allergies   Allergen Reactions     No Known Drug Allergies             Current Outpatient Medications   Medication Sig Dispense Refill     aspirin (ASA) 81 MG EC tablet Take 2 tablets (162 mg) by mouth daily (Patient not taking: Reported on 5/13/2022) 60 tablet 0     HYDROcodone-acetaminophen (NORCO) 5-325 MG tablet Take 1 tablet by mouth every 6 hours as needed for severe pain (Patient not taking: Reported on 5/13/2022)       order for DME Equipment being ordered: wrist brace , right (Patient not taking: Reported on 5/13/2022) 1 Device 0     oxyCODONE (ROXICODONE) 5 MG tablet Take 1-2 tablets (5-10 mg) by mouth every 4 hours as needed for moderate to severe pain (Patient not taking: Reported on 5/13/2022) 30 tablet 0     senna (SENOKOT) 8.6 MG tablet Take 1 tablet by mouth daily (Patient not taking: Reported on 5/13/2022) 30 tablet 0          Review Of Systems  Skin: negative  Eyes:  negative  Ears/Nose/Throat: negative  Respiratory: No shortness of breath, dyspnea on exertion, cough, or hemoptysis    O: Physical exam: Patient has adequate ankle and subtalar motion.  Wounds are supple.  Well epithelialized clean dry and intact.  Circulation and sensory tact right lower extremity.    Images:Findings:     Standing AP, oblique, and lateral  views of the right ankle were  obtained.      No acute osseous abnormality.       Stable postsurgical change of the distal fibular plate and screw  fixation including screws traversing across the distal syndesmosis.  Ongoing healing of the prior fracture with decreased conspicuity of  the fracture line.     Ongoing healing posterior malleolar fracture with increased callus  formation.     Ankle mortise and syndesmosis are congruent on this non-weight bearing  images.                                                                      IMPRESSION: Ongoing healing of distal fibular and posterior malleolar  fracture status post ORIF.         A: Stable following fibular fracture fixation right lower extremity with syndesmotic screw fixation.  Reduction of the talus in the ankle mortise.    P: Patient can begin weightbearing as tolerated.  He is to have caution over the next month he can return to swimming or cycling carefully.  I have suggested less stressful weightbearing.  Certainly no return to running activities at least for the next month.  We will see him back in 3 months.  I would like to have repeat three-view ankle at that time.  We will also see him at 1 year postop.  He will notify us if there are any exacerbation of symptoms.           In addition to the above assessment and plan each active problem on Alfonso's problem list was evaluated today. This included the questioning of Alfonso for any medication problems. We will continue the current treatment plan for these active problems except as noted.

## 2022-06-10 NOTE — LETTER
Date:Jeannie 10, 2022      Provider requested that no letter be sent. Do not send.       [employer]

## 2022-06-10 NOTE — LETTER
6/10/2022         RE: Alfonso Snowden  [address]  [facility] [address]        Dear Colleague,    Thank you for referring your patient, Alfonso Snowden, to the [facility] [place]. Please see a copy of my visit note below.    S: Patient states he is doing well after fracture fixation right ankle.  No problems at this time.  He has remained nonweightbearing right lower extremity.  He has been working on ankle motion.  No fevers or chills.         Patient Active Problem List   Diagnosis     Closed fracture of right ankle, initial encounter            Past Medical History:   Diagnosis Date     Acute bronchiolitis due to other infectious organisms     twice as an infant.            Past Surgical History:   Procedure Laterality Date     OPEN REDUCTION INTERNAL FIXATION FIBULA Right 4/13/2022    Procedure: OPEN REDUCTION INTERNAL FIXATION, FRACTURE, RIGHT FIBULA;  Surgeon: Andrea Houser MD;  [place] [facility] [place]            Social History     Tobacco Use     Smoking status: Never Smoker     Smokeless tobacco: Never Used   Substance Use Topics     Alcohol use: Yes     Comment: rarely            Family History   Problem Relation Age of Onset     Respiratory Father         asthma     Respiratory Paternal Grandfather         asthma               Allergies   Allergen Reactions     No Known Drug Allergies             Current Outpatient Medications   Medication Sig Dispense Refill     aspirin (ASA) 81 MG EC tablet Take 2 tablets (162 mg) by mouth daily (Patient not taking: Reported on 5/13/2022) 60 tablet 0     HYDROcodone-acetaminophen (NORCO) 5-325 MG tablet Take 1 tablet by mouth every 6 hours as needed for severe pain (Patient not taking: Reported on 5/13/2022)       order for DME Equipment being ordered: wrist brace , right (Patient not taking: Reported on 5/13/2022) 1 Device 0     oxyCODONE (ROXICODONE) 5 MG tablet Take 1-2 tablets (5-10 mg) by mouth every 4 hours  as needed for moderate to severe pain (Patient not taking: Reported on 5/13/2022) 30 tablet 0     senna (SENOKOT) 8.6 MG tablet Take 1 tablet by mouth daily (Patient not taking: Reported on 5/13/2022) 30 tablet 0          Review Of Systems  Skin: negative  Eyes: negative  Ears/Nose/Throat: negative  Respiratory: No shortness of breath, dyspnea on exertion, cough, or hemoptysis    O: Physical exam: Patient has adequate ankle and subtalar motion.  Wounds are supple.  Well epithelialized clean dry and intact.  Circulation and sensory tact right lower extremity.    Images:Findings:     Standing AP, oblique, and lateral  views of the right ankle were  obtained.      No acute osseous abnormality.       Stable postsurgical change of the distal fibular plate and screw  fixation including screws traversing across the distal syndesmosis.  Ongoing healing of the prior fracture with decreased conspicuity of  the fracture line.     Ongoing healing posterior malleolar fracture with increased callus  formation.     Ankle mortise and syndesmosis are congruent on this non-weight bearing  images.                                                                      IMPRESSION: Ongoing healing of distal fibular and posterior malleolar  fracture status post ORIF.         A: Stable following fibular fracture fixation right lower extremity with syndesmotic screw fixation.  Reduction of the talus in the ankle mortise.    P: Patient can begin weightbearing as tolerated.  He is to have caution over the next month he can return to swimming or cycling carefully.  I have suggested less stressful weightbearing.  Certainly no return to running activities at least for the next month.  We will see him back in 3 months.  I would like to have repeat three-view ankle at that time.  We will also see him at 1 year postop.  He will notify us if there are any exacerbation of symptoms.           In addition to the above assessment and plan each active  problem on Alfonso's problem list was evaluated today. This included the questioning of Alfonso for any medication problems. We will continue the current treatment plan for these active problems except as noted.        Again, thank you for allowing me to participate in the care of your patient.        Sincerely,        PAULETTE CARRILLO MD

## 2022-08-11 DIAGNOSIS — S82.891D CLOSED FRACTURE OF RIGHT ANKLE WITH ROUTINE HEALING, SUBSEQUENT ENCOUNTER: Primary | ICD-10-CM

## 2022-09-09 ENCOUNTER — TELEPHONE (OUTPATIENT)
Dept: ORTHOPEDICS | Facility: CLINIC | Age: 22
End: 2022-09-09

## 2022-09-16 ENCOUNTER — ANCILLARY PROCEDURE (OUTPATIENT)
Dept: GENERAL RADIOLOGY | Facility: CLINIC | Age: 22
End: 2022-09-16
Attending: ORTHOPAEDIC SURGERY

## 2022-09-16 ENCOUNTER — OFFICE VISIT (OUTPATIENT)
Dept: ORTHOPEDICS | Facility: CLINIC | Age: 22
End: 2022-09-16

## 2022-09-16 VITALS — BODY MASS INDEX: 40.09 KG/M2 | WEIGHT: 296 LBS | HEIGHT: 72 IN

## 2022-09-16 DIAGNOSIS — S82.891D CLOSED FRACTURE OF RIGHT ANKLE WITH ROUTINE HEALING, SUBSEQUENT ENCOUNTER: ICD-10-CM

## 2022-09-16 DIAGNOSIS — S82.891D CLOSED FRACTURE OF RIGHT ANKLE WITH ROUTINE HEALING, SUBSEQUENT ENCOUNTER: Primary | ICD-10-CM

## 2022-09-16 PROCEDURE — 73610 X-RAY EXAM OF ANKLE: CPT | Mod: RT | Performed by: RADIOLOGY

## 2022-09-16 PROCEDURE — 99213 OFFICE O/P EST LOW 20 MIN: CPT | Performed by: ORTHOPAEDIC SURGERY

## 2022-09-16 NOTE — LETTER
Date:September 16, 2022      Provider requested that no letter be sent. Do not send.       [facility]

## 2022-09-16 NOTE — PROGRESS NOTES
S:  Doing well after R ankle ORIF and syndesmotic screw placement. Some discomfort with jogging.  No fever/chills.         Patient Active Problem List   Diagnosis     Closed fracture of right ankle, initial encounter            Past Medical History:   Diagnosis Date     Acute bronchiolitis due to other infectious organisms     twice as an infant.            Past Surgical History:   Procedure Laterality Date     OPEN REDUCTION INTERNAL FIXATION FIBULA Right 4/13/2022    Procedure: OPEN REDUCTION INTERNAL FIXATION, FRACTURE, RIGHT FIBULA;  Surgeon: Andrea Houser MD;  [place] [facility] [place]            Social History     Tobacco Use     Smoking status: Never Smoker     Smokeless tobacco: Never Used   Substance Use Topics     Alcohol use: Yes     Comment: rarely            Family History   Problem Relation Age of Onset     Respiratory Father         asthma     Respiratory Paternal Grandfather         asthma               Allergies   Allergen Reactions     No Known Drug Allergies             Current Outpatient Medications   Medication Sig Dispense Refill     aspirin (ASA) 81 MG EC tablet Take 2 tablets (162 mg) by mouth daily (Patient not taking: Reported on 5/13/2022) 60 tablet 0     HYDROcodone-acetaminophen (NORCO) 5-325 MG tablet Take 1 tablet by mouth every 6 hours as needed for severe pain (Patient not taking: Reported on 5/13/2022)       order for DME Equipment being ordered: wrist brace , right (Patient not taking: Reported on 5/13/2022) 1 Device 0     oxyCODONE (ROXICODONE) 5 MG tablet Take 1-2 tablets (5-10 mg) by mouth every 4 hours as needed for moderate to severe pain (Patient not taking: Reported on 5/13/2022) 30 tablet 0     senna (SENOKOT) 8.6 MG tablet Take 1 tablet by mouth daily (Patient not taking: Reported on 5/13/2022) 30 tablet 0          Review Of Systems  Skin: negative  Eyes: negative  Ears/Nose/Throat: negative  Respiratory: No shortness of breath, dyspnea on exertion, cough, or hemoptysis    O:  Physical exam:  Some edema and tenderness to palpation anterior tibiotalar joint and medial proximal tibio talar.  Not over distal fibula or over medial malleolus.    Images:  Anatomic talus in ankle mortise.  Adequate orientation of hardware.  No issues identified.  Some expected toggle about proximal syndesmotic screw on lateral and about most distal screw head at fibula.         A: Stable R ankle after ORIF and syndesmotic screw placement.    P:  Continue to increase activities as tolerated and see back in one year with new images if needed.  Notify if exacerbation symptoms.           In addition to the above assessment and plan each active problem on Alfonso's problem list was evaluated today. This included the questioning of Alfonso for any medication problems. We will continue the current treatment plan for these active problems except as noted.

## 2022-09-16 NOTE — NURSING NOTE
"Reason For Visit:   Chief Complaint   Patient presents with     RECHECK     Follow-up 5 months post R ankle ORIF.  Patient denies pain in right ankle.  Patient reports attempting to jog/run 2 weeks ago and continues to increase activity level.  Mild discomfort with jogging that resolved with rest.       Ht 1.83 m (6' 0.05\")   Wt 134.3 kg (296 lb)   BMI 40.09 kg/m           Andrea Nolan ATC    "

## 2022-09-16 NOTE — LETTER
9/16/2022         RE: Alfonso Snowden  [address]  [place] [address]        Dear Colleague,    Thank you for referring your patient, Alfonso Snowden, to the [facility] [place]. Please see a copy of my visit note below.    S:  Doing well after R ankle ORIF and syndesmotic screw placement. Some discomfort with jogging.  No fever/chills.         Patient Active Problem List   Diagnosis     Closed fracture of right ankle, initial encounter            Past Medical History:   Diagnosis Date     Acute bronchiolitis due to other infectious organisms     twice as an infant.            Past Surgical History:   Procedure Laterality Date     OPEN REDUCTION INTERNAL FIXATION FIBULA Right 4/13/2022    Procedure: OPEN REDUCTION INTERNAL FIXATION, FRACTURE, RIGHT FIBULA;  Surgeon: Andrea Houser MD;  Location: [facility]            Social History     Tobacco Use     Smoking status: Never Smoker     Smokeless tobacco: Never Used   Substance Use Topics     Alcohol use: Yes     Comment: rarely            Family History   Problem Relation Age of Onset     Respiratory Father         asthma     Respiratory Paternal Grandfather         asthma               Allergies   Allergen Reactions     No Known Drug Allergies             Current Outpatient Medications   Medication Sig Dispense Refill     aspirin (ASA) 81 MG EC tablet Take 2 tablets (162 mg) by mouth daily (Patient not taking: Reported on 5/13/2022) 60 tablet 0     HYDROcodone-acetaminophen (NORCO) 5-325 MG tablet Take 1 tablet by mouth every 6 hours as needed for severe pain (Patient not taking: Reported on 5/13/2022)       order for DME Equipment being ordered: wrist brace , right (Patient not taking: Reported on 5/13/2022) 1 Device 0     oxyCODONE (ROXICODONE) 5 MG tablet Take 1-2 tablets (5-10 mg) by mouth every 4 hours as needed for moderate to severe pain (Patient not taking: Reported on 5/13/2022) 30 tablet 0      senna (SENOKOT) 8.6 MG tablet Take 1 tablet by mouth daily (Patient not taking: Reported on 5/13/2022) 30 tablet 0          Review Of Systems  Skin: negative  Eyes: negative  Ears/Nose/Throat: negative  Respiratory: No shortness of breath, dyspnea on exertion, cough, or hemoptysis    O: Physical exam:  Some edema and tenderness to palpation anterior tibiotalar joint and medial proximal tibio talar.  Not over distal fibula or over medial malleolus.    Images:  Anatomic talus in ankle mortise.  Adequate orientation of hardware.  No issues identified.  Some expected toggle about proximal syndesmotic screw on lateral and about most distal screw head at fibula.         A: Stable R ankle after ORIF and syndesmotic screw placement.    P:  Continue to increase activities as tolerated and see back in one year with new images if needed.  Notify if exacerbation symptoms.           In addition to the above assessment and plan each active problem on Alfonso's problem list was evaluated today. This included the questioning of Alfonso for any medication problems. We will continue the current treatment plan for these active problems except as noted.        Again, thank you for allowing me to participate in the care of your patient.        Sincerely,        PAULETTE CARRILLO MD

## 2022-10-09 ENCOUNTER — NURSE TRIAGE (OUTPATIENT)
Dept: NURSING | Facility: CLINIC | Age: 22
End: 2022-10-09

## 2022-10-09 NOTE — TELEPHONE ENCOUNTER
Nurse Triage SBAR    Is this a 2nd Level Triage? NO    Situation: Patient calling with severe right ankle pain when walking.  Consent: not needed    Background: Patient had surgery in April for right ankle fracture    Woke up this morning unable to step on right ankle. Long shift at work yesterday - on his feet a long time    Assessment:   No increased swelling   No redness or bruising  Mild warmth  Sitting - no pain   Walking - 9-10/10    Protocol Recommended Disposition:   Home care    Recommendation: Advised home care and when to call back. Care advice given. Patient verbalized understanding and agreed with plan.     Carmel Aquino RN [facility] 10/9/2022 1:55 PM    Reason for Disposition    Caused by overuse from recent vigorous activity (e.g., vigorous activity, running)    Additional Information    Negative: Followed an ankle injury    Negative: Foot pain is main symptom    Negative: Thigh or calf pain is main symptom    Negative: Ankle swelling is main symptom    Negative: Thigh or calf swelling is main symptom    Negative: Entire foot is cool or blue in comparison to other side    Negative: [1] Swollen joint AND [2] fever    Negative: [1] Red area or streak AND [2] fever    Negative: Patient sounds very sick or weak to the triager    Negative: [1] SEVERE pain (e.g., excruciating, unable to walk) AND [2] not improved after 2 hours of pain medicine    Negative: [1] Thigh or calf pain AND [2] only 1 side AND [3] present > 1 hour    Negative: [1] Calf swelling AND [2] only 1 side    Negative: [1] Looks infected (spreading redness, pus) AND [2] large red area (> 2 in. or 5 cm)    Negative: Looks like a boil, infected sore, or deep ulcer    Negative: [1] Redness of the skin AND [2] no fever    Negative: [1] Very swollen joint AND [2] no fever    Negative: [1] MODERATE pain (e.g., interferes with normal activities, limping) AND [2] present > 3 days    Negative: [1] Swollen joint AND [2] no fever or  redness    Negative: [1] MILD pain (e.g., does not interfere with normal activities) AND [2] present > 7 days    Negative: Ankle pain is a chronic symptom (recurrent or ongoing AND present > 4 weeks)    Protocols used: ANKLE PAIN-A-AH

## 2022-12-05 ENCOUNTER — TELEPHONE (OUTPATIENT)
Dept: ORTHOPEDICS | Facility: CLINIC | Age: 22
End: 2022-12-05

## 2022-12-05 NOTE — TELEPHONE ENCOUNTER
RN called pt and LVM to send ADA paperwork to [facility] at [phone] or call at [phone].    Marcelo Medellin RNCC

## (undated) DEVICE — SOL WATER IRRIG 500ML BOTTLE 2F7113

## (undated) DEVICE — DRAPE IOBAN INCISE 23X17" 6650EZ

## (undated) DEVICE — SU VICRYL 1 CT-1 27" UND J261H

## (undated) DEVICE — GOWN IMPERVIOUS BREATHABLE SMART XLG 89045

## (undated) DEVICE — SOL NACL 0.9% IRRIG 500ML BOTTLE 2F7123

## (undated) DEVICE — Device

## (undated) DEVICE — IMP SCR SYN CORTEX 3.5X18MM SELF TAP SS 204.818
Type: IMPLANTABLE DEVICE | Site: ANKLE | Status: NON-FUNCTIONAL
Removed: 2022-04-13

## (undated) DEVICE — ESU GROUND PAD ADULT W/CORD E7507

## (undated) DEVICE — DRSG XEROFORM 5X9" 8884431605

## (undated) DEVICE — CAST PADDING 4" STERILE 9044S

## (undated) DEVICE — SU VICRYL 2-0 CT-1 27" UND J259H

## (undated) DEVICE — DRAPE CONVERTORS U-DRAPE 60X72" 8476

## (undated) DEVICE — SU VICRYL 3-0 SH 27" UND J416H

## (undated) DEVICE — DRAPE C-ARM W/STRAPS 42X72" 07-CA104

## (undated) DEVICE — LINEN TOWEL PACK X6 WHITE 5487

## (undated) DEVICE — STRAP KNEE/BODY 31143004

## (undated) DEVICE — SU MONOCRYL 3-0 PS-1 27" Y936H

## (undated) DEVICE — SU VICRYL 2-0 SH 27" UND J417H

## (undated) DEVICE — DRAPE STOCKINETTE IMPERVIOUS 10" 21048

## (undated) DEVICE — SU VICRYL 0 CT-1 27" UND J260H

## (undated) DEVICE — PREP CHLORAPREP 26ML TINTED ORANGE  260815

## (undated) DEVICE — DRAPE C-ARMOR 5 SIDED 5523

## (undated) DEVICE — DRSG KERLIX 4 1/2"X4YDS ROLL 6730

## (undated) DEVICE — PACK LOWER EXTREMITY CUSTOM ASC

## (undated) DEVICE — SUCTION MANIFOLD NEPTUNE 2 SYS 4 PORT 0702-020-000

## (undated) DEVICE — CAST PLASTER ROLL 4"  7374

## (undated) DEVICE — GLOVE PROTEXIS W/NEU-THERA 8.5  2D73TE85

## (undated) DEVICE — LINEN TOWEL PACK X30 5481

## (undated) DEVICE — GLOVE PROTEXIS BLUE W/NEU-THERA 8.0  2D73EB80

## (undated) RX ORDER — FENTANYL CITRATE 50 UG/ML
INJECTION, SOLUTION INTRAMUSCULAR; INTRAVENOUS
Status: DISPENSED
Start: 2022-04-13

## (undated) RX ORDER — PROPOFOL 10 MG/ML
INJECTION, EMULSION INTRAVENOUS
Status: DISPENSED
Start: 2022-04-13

## (undated) RX ORDER — CEFAZOLIN SODIUM 1 G/3ML
INJECTION, POWDER, FOR SOLUTION INTRAMUSCULAR; INTRAVENOUS
Status: DISPENSED
Start: 2022-04-13

## (undated) RX ORDER — LIDOCAINE HYDROCHLORIDE 20 MG/ML
INJECTION, SOLUTION EPIDURAL; INFILTRATION; INTRACAUDAL; PERINEURAL
Status: DISPENSED
Start: 2022-04-13

## (undated) RX ORDER — DEXAMETHASONE SODIUM PHOSPHATE 4 MG/ML
INJECTION, SOLUTION INTRA-ARTICULAR; INTRALESIONAL; INTRAMUSCULAR; INTRAVENOUS; SOFT TISSUE
Status: DISPENSED
Start: 2022-04-13

## (undated) RX ORDER — ONDANSETRON 2 MG/ML
INJECTION INTRAMUSCULAR; INTRAVENOUS
Status: DISPENSED
Start: 2022-04-13

## (undated) RX ORDER — BUPIVACAINE HYDROCHLORIDE 2.5 MG/ML
INJECTION, SOLUTION EPIDURAL; INFILTRATION; INTRACAUDAL
Status: DISPENSED
Start: 2022-04-13

## (undated) RX ORDER — OXYCODONE HYDROCHLORIDE 5 MG/1
TABLET ORAL
Status: DISPENSED
Start: 2022-04-13

## (undated) RX ORDER — ACETAMINOPHEN 325 MG/1
TABLET ORAL
Status: DISPENSED
Start: 2022-04-13